# Patient Record
Sex: MALE | Race: WHITE | NOT HISPANIC OR LATINO | Employment: OTHER | URBAN - METROPOLITAN AREA
[De-identification: names, ages, dates, MRNs, and addresses within clinical notes are randomized per-mention and may not be internally consistent; named-entity substitution may affect disease eponyms.]

---

## 2023-06-06 ENCOUNTER — TELEPHONE (OUTPATIENT)
Dept: PRIMARY CARE | Facility: CLINIC | Age: 73
End: 2023-06-06
Payer: COMMERCIAL

## 2023-06-06 DIAGNOSIS — N40.1 BPH ASSOCIATED WITH NOCTURIA: ICD-10-CM

## 2023-06-06 DIAGNOSIS — E55.9 VITAMIN D DEFICIENCY: Primary | ICD-10-CM

## 2023-06-06 DIAGNOSIS — D70.9 NEUTROPENIA, UNSPECIFIED TYPE (CMS-HCC): ICD-10-CM

## 2023-06-06 DIAGNOSIS — R73.03 PREDIABETES: ICD-10-CM

## 2023-06-06 DIAGNOSIS — E78.5 DYSLIPIDEMIA, GOAL LDL BELOW 100: ICD-10-CM

## 2023-06-06 DIAGNOSIS — R35.1 BPH ASSOCIATED WITH NOCTURIA: ICD-10-CM

## 2023-06-06 NOTE — TELEPHONE ENCOUNTER
Pt called stating has an upcoming appt 6/22/23 asked for blood work order, would like orders to include PSA and TSH

## 2023-06-12 ENCOUNTER — LAB (OUTPATIENT)
Dept: LAB | Facility: LAB | Age: 73
End: 2023-06-12
Payer: COMMERCIAL

## 2023-06-12 DIAGNOSIS — E55.9 VITAMIN D DEFICIENCY: ICD-10-CM

## 2023-06-12 DIAGNOSIS — D70.9 NEUTROPENIA, UNSPECIFIED TYPE (CMS-HCC): ICD-10-CM

## 2023-06-12 DIAGNOSIS — N40.1 BPH ASSOCIATED WITH NOCTURIA: ICD-10-CM

## 2023-06-12 DIAGNOSIS — E78.5 DYSLIPIDEMIA, GOAL LDL BELOW 100: ICD-10-CM

## 2023-06-12 DIAGNOSIS — R35.1 BPH ASSOCIATED WITH NOCTURIA: ICD-10-CM

## 2023-06-12 DIAGNOSIS — R73.03 PREDIABETES: ICD-10-CM

## 2023-06-12 LAB
ANION GAP IN SER/PLAS: 13 MMOL/L (ref 10–20)
BASOPHILS (10*3/UL) IN BLOOD BY AUTOMATED COUNT: 0.03 X10E9/L (ref 0–0.1)
BASOPHILS/100 LEUKOCYTES IN BLOOD BY AUTOMATED COUNT: 0.6 % (ref 0–2)
CALCIDIOL (25 OH VITAMIN D3) (NG/ML) IN SER/PLAS: 26 NG/ML
CALCIUM (MG/DL) IN SER/PLAS: 9.3 MG/DL (ref 8.6–10.3)
CARBON DIOXIDE, TOTAL (MMOL/L) IN SER/PLAS: 26 MMOL/L (ref 21–32)
CHLORIDE (MMOL/L) IN SER/PLAS: 106 MMOL/L (ref 98–107)
CHOLESTEROL (MG/DL) IN SER/PLAS: 142 MG/DL (ref 0–199)
CHOLESTEROL IN HDL (MG/DL) IN SER/PLAS: 66.9 MG/DL
CHOLESTEROL/HDL RATIO: 2.1
CREATININE (MG/DL) IN SER/PLAS: 0.9 MG/DL (ref 0.5–1.3)
EOSINOPHILS (10*3/UL) IN BLOOD BY AUTOMATED COUNT: 0.17 X10E9/L (ref 0–0.4)
EOSINOPHILS/100 LEUKOCYTES IN BLOOD BY AUTOMATED COUNT: 3.6 % (ref 0–6)
ERYTHROCYTE DISTRIBUTION WIDTH (RATIO) BY AUTOMATED COUNT: 12.4 % (ref 11.5–14.5)
ERYTHROCYTE MEAN CORPUSCULAR HEMOGLOBIN CONCENTRATION (G/DL) BY AUTOMATED: 33.6 G/DL (ref 32–36)
ERYTHROCYTE MEAN CORPUSCULAR VOLUME (FL) BY AUTOMATED COUNT: 91 FL (ref 80–100)
ERYTHROCYTES (10*6/UL) IN BLOOD BY AUTOMATED COUNT: 4.72 X10E12/L (ref 4.5–5.9)
ESTIMATED AVERAGE GLUCOSE FOR HBA1C: 111 MG/DL
GFR MALE: 89 ML/MIN/1.73M2
GLUCOSE (MG/DL) IN SER/PLAS: 105 MG/DL (ref 74–99)
HEMATOCRIT (%) IN BLOOD BY AUTOMATED COUNT: 43.1 % (ref 41–52)
HEMOGLOBIN (G/DL) IN BLOOD: 14.5 G/DL (ref 13.5–17.5)
HEMOGLOBIN A1C/HEMOGLOBIN TOTAL IN BLOOD: 5.5 %
IMMATURE GRANULOCYTES/100 LEUKOCYTES IN BLOOD BY AUTOMATED COUNT: 0.2 % (ref 0–0.9)
LDL: 60 MG/DL (ref 0–99)
LEUKOCYTES (10*3/UL) IN BLOOD BY AUTOMATED COUNT: 4.7 X10E9/L (ref 4.4–11.3)
LYMPHOCYTES (10*3/UL) IN BLOOD BY AUTOMATED COUNT: 1.91 X10E9/L (ref 0.8–3)
LYMPHOCYTES/100 LEUKOCYTES IN BLOOD BY AUTOMATED COUNT: 40.4 % (ref 13–44)
MONOCYTES (10*3/UL) IN BLOOD BY AUTOMATED COUNT: 0.37 X10E9/L (ref 0.05–0.8)
MONOCYTES/100 LEUKOCYTES IN BLOOD BY AUTOMATED COUNT: 7.8 % (ref 2–10)
NEUTROPHILS (10*3/UL) IN BLOOD BY AUTOMATED COUNT: 2.24 X10E9/L (ref 1.6–5.5)
NEUTROPHILS/100 LEUKOCYTES IN BLOOD BY AUTOMATED COUNT: 47.4 % (ref 40–80)
PLATELETS (10*3/UL) IN BLOOD AUTOMATED COUNT: 178 X10E9/L (ref 150–450)
POTASSIUM (MMOL/L) IN SER/PLAS: 4.1 MMOL/L (ref 3.5–5.3)
PROSTATE SPECIFIC AG (NG/ML) IN SER/PLAS: 1.6 NG/ML (ref 0–4)
SODIUM (MMOL/L) IN SER/PLAS: 141 MMOL/L (ref 136–145)
TRIGLYCERIDE (MG/DL) IN SER/PLAS: 75 MG/DL (ref 0–149)
UREA NITROGEN (MG/DL) IN SER/PLAS: 13 MG/DL (ref 6–23)
VLDL: 15 MG/DL (ref 0–40)

## 2023-06-12 PROCEDURE — 80048 BASIC METABOLIC PNL TOTAL CA: CPT

## 2023-06-12 PROCEDURE — 82306 VITAMIN D 25 HYDROXY: CPT

## 2023-06-12 PROCEDURE — 80061 LIPID PANEL: CPT

## 2023-06-12 PROCEDURE — 85025 COMPLETE CBC W/AUTO DIFF WBC: CPT

## 2023-06-12 PROCEDURE — 83036 HEMOGLOBIN GLYCOSYLATED A1C: CPT

## 2023-06-12 PROCEDURE — 36415 COLL VENOUS BLD VENIPUNCTURE: CPT

## 2023-06-12 PROCEDURE — 84153 ASSAY OF PSA TOTAL: CPT

## 2023-06-22 ENCOUNTER — OFFICE VISIT (OUTPATIENT)
Dept: PRIMARY CARE | Facility: CLINIC | Age: 73
End: 2023-06-22
Payer: COMMERCIAL

## 2023-06-22 VITALS
OXYGEN SATURATION: 95 % | RESPIRATION RATE: 16 BRPM | SYSTOLIC BLOOD PRESSURE: 122 MMHG | HEART RATE: 90 BPM | TEMPERATURE: 97.4 F | HEIGHT: 70 IN | DIASTOLIC BLOOD PRESSURE: 76 MMHG | WEIGHT: 208.8 LBS | BODY MASS INDEX: 29.89 KG/M2

## 2023-06-22 DIAGNOSIS — D12.6 TUBULAR ADENOMA OF COLON: ICD-10-CM

## 2023-06-22 DIAGNOSIS — E55.9 VITAMIN D DEFICIENCY: ICD-10-CM

## 2023-06-22 DIAGNOSIS — K80.20 CALCULUS OF GALLBLADDER WITHOUT CHOLECYSTITIS WITHOUT OBSTRUCTION: ICD-10-CM

## 2023-06-22 DIAGNOSIS — I77.810 MILD ASCENDING AORTA DILATATION (CMS-HCC): ICD-10-CM

## 2023-06-22 DIAGNOSIS — I71.21 ANEURYSM OF ASCENDING AORTA WITHOUT RUPTURE (CMS-HCC): ICD-10-CM

## 2023-06-22 DIAGNOSIS — J30.2 SEASONAL ALLERGIES: ICD-10-CM

## 2023-06-22 DIAGNOSIS — I10 ESSENTIAL HYPERTENSION WITH GOAL BLOOD PRESSURE LESS THAN 130/85: ICD-10-CM

## 2023-06-22 DIAGNOSIS — I48.92 PAROXYSMAL ATRIAL FLUTTER (MULTI): Primary | ICD-10-CM

## 2023-06-22 DIAGNOSIS — E78.5 DYSLIPIDEMIA, GOAL LDL BELOW 70: ICD-10-CM

## 2023-06-22 DIAGNOSIS — K82.4 GALL BLADDER POLYP: ICD-10-CM

## 2023-06-22 PROBLEM — J32.9 RECURRENT SINUSITIS: Status: RESOLVED | Noted: 2023-06-22 | Resolved: 2023-06-22

## 2023-06-22 PROBLEM — R03.0 BLOOD PRESSURE ELEVATED WITHOUT HISTORY OF HTN: Status: RESOLVED | Noted: 2023-06-22 | Resolved: 2023-06-22

## 2023-06-22 PROBLEM — I34.0 MITRAL REGURGITATION: Status: ACTIVE | Noted: 2023-06-22

## 2023-06-22 PROBLEM — K21.9 GASTROESOPHAGEAL REFLUX DISEASE WITH HIATAL HERNIA: Status: ACTIVE | Noted: 2023-06-22

## 2023-06-22 PROBLEM — J34.2 DEVIATED NASAL SEPTUM: Status: ACTIVE | Noted: 2023-06-22

## 2023-06-22 PROBLEM — R73.03 PREDIABETES: Status: ACTIVE | Noted: 2023-06-22

## 2023-06-22 PROBLEM — J32.9 RECURRENT SINUSITIS: Status: ACTIVE | Noted: 2023-06-22

## 2023-06-22 PROBLEM — N40.0 BENIGN ENLARGEMENT OF PROSTATE: Status: ACTIVE | Noted: 2023-06-22

## 2023-06-22 PROBLEM — R09.81 NASAL CONGESTION: Status: ACTIVE | Noted: 2023-06-22

## 2023-06-22 PROBLEM — R03.0 BLOOD PRESSURE ELEVATED WITHOUT HISTORY OF HTN: Status: ACTIVE | Noted: 2023-06-22

## 2023-06-22 PROBLEM — K76.89 LIVER NODULE: Status: ACTIVE | Noted: 2023-06-22

## 2023-06-22 PROBLEM — R44.8 FACIAL PRESSURE: Status: ACTIVE | Noted: 2023-06-22

## 2023-06-22 PROBLEM — J06.9 UPPER RESPIRATORY INFECTION, ACUTE: Status: ACTIVE | Noted: 2023-06-22

## 2023-06-22 PROBLEM — L98.9 SKIN LESION: Status: ACTIVE | Noted: 2023-06-22

## 2023-06-22 PROBLEM — M17.12 ARTHRITIS OF KNEE, LEFT: Status: ACTIVE | Noted: 2023-06-22

## 2023-06-22 PROBLEM — H90.3 ASNHL (ASYMMETRICAL SENSORINEURAL HEARING LOSS): Status: ACTIVE | Noted: 2023-06-22

## 2023-06-22 PROBLEM — N52.9 MALE ERECTILE DISORDER OF ORGANIC ORIGIN: Status: ACTIVE | Noted: 2023-06-22

## 2023-06-22 PROBLEM — K44.9 GASTROESOPHAGEAL REFLUX DISEASE WITH HIATAL HERNIA: Status: ACTIVE | Noted: 2023-06-22

## 2023-06-22 PROBLEM — R93.89 ULTRASOUND SCAN ABNORMAL: Status: ACTIVE | Noted: 2023-06-22

## 2023-06-22 PROBLEM — E66.3 OVERWEIGHT WITH BODY MASS INDEX (BMI) 25.0-29.9: Status: ACTIVE | Noted: 2023-06-22

## 2023-06-22 PROBLEM — R09.81 NASAL CONGESTION: Status: RESOLVED | Noted: 2023-06-22 | Resolved: 2023-06-22

## 2023-06-22 PROBLEM — K43.9 VENTRAL HERNIA: Status: ACTIVE | Noted: 2023-06-22

## 2023-06-22 PROBLEM — H91.90 HEARING LOSS: Status: ACTIVE | Noted: 2023-06-22

## 2023-06-22 PROBLEM — H90.6 MIXED CONDUCTIVE AND SENSORINEURAL HEARING LOSS OF BOTH EARS: Status: ACTIVE | Noted: 2023-06-22

## 2023-06-22 PROBLEM — L98.9 SKIN LESION: Status: RESOLVED | Noted: 2023-06-22 | Resolved: 2023-06-22

## 2023-06-22 PROBLEM — J06.9 UPPER RESPIRATORY INFECTION, ACUTE: Status: RESOLVED | Noted: 2023-06-22 | Resolved: 2023-06-22

## 2023-06-22 PROBLEM — H93.11 TINNITUS OF RIGHT EAR: Status: ACTIVE | Noted: 2023-06-22

## 2023-06-22 PROCEDURE — 3078F DIAST BP <80 MM HG: CPT | Performed by: INTERNAL MEDICINE

## 2023-06-22 PROCEDURE — 99397 PER PM REEVAL EST PAT 65+ YR: CPT | Performed by: INTERNAL MEDICINE

## 2023-06-22 PROCEDURE — 1159F MED LIST DOCD IN RCRD: CPT | Performed by: INTERNAL MEDICINE

## 2023-06-22 PROCEDURE — 1036F TOBACCO NON-USER: CPT | Performed by: INTERNAL MEDICINE

## 2023-06-22 PROCEDURE — 3074F SYST BP LT 130 MM HG: CPT | Performed by: INTERNAL MEDICINE

## 2023-06-22 PROCEDURE — 1160F RVW MEDS BY RX/DR IN RCRD: CPT | Performed by: INTERNAL MEDICINE

## 2023-06-22 RX ORDER — TRAZODONE HYDROCHLORIDE 50 MG/1
1 TABLET ORAL NIGHTLY
COMMUNITY
Start: 2014-08-07 | End: 2023-12-01 | Stop reason: SDUPTHER

## 2023-06-22 RX ORDER — ATENOLOL 25 MG/1
1 TABLET ORAL DAILY
COMMUNITY
Start: 2020-01-10 | End: 2024-02-19 | Stop reason: SDUPTHER

## 2023-06-22 RX ORDER — ESOMEPRAZOLE MAGNESIUM 40 MG/1
CAPSULE, DELAYED RELEASE ORAL
COMMUNITY
Start: 2012-11-26 | End: 2023-06-22 | Stop reason: ALTCHOICE

## 2023-06-22 RX ORDER — APIXABAN 5 MG/1
1 TABLET, FILM COATED ORAL 2 TIMES DAILY
COMMUNITY
Start: 2021-06-21 | End: 2024-05-15

## 2023-06-22 RX ORDER — TAMSULOSIN HYDROCHLORIDE 0.4 MG/1
CAPSULE ORAL
COMMUNITY
Start: 2012-11-06 | End: 2024-02-19 | Stop reason: SDUPTHER

## 2023-06-22 RX ORDER — HYDROGEN PEROXIDE 3 %
20 SOLUTION, NON-ORAL MISCELLANEOUS
COMMUNITY
End: 2024-01-16 | Stop reason: ALTCHOICE

## 2023-06-22 RX ORDER — AZITHROMYCIN 250 MG/1
500 TABLET, FILM COATED ORAL
COMMUNITY
Start: 2005-06-07 | End: 2023-06-22 | Stop reason: ALTCHOICE

## 2023-06-22 RX ORDER — ATOVAQUONE AND PROGUANIL HYDROCHLORIDE 250; 100 MG/1; MG/1
1 TABLET, FILM COATED ORAL
COMMUNITY
Start: 2008-10-21 | End: 2023-06-22 | Stop reason: ALTCHOICE

## 2023-06-22 RX ORDER — SIMVASTATIN 20 MG/1
TABLET, FILM COATED ORAL
COMMUNITY
Start: 2017-01-24 | End: 2024-02-19 | Stop reason: SDUPTHER

## 2023-06-22 RX ORDER — CHOLECALCIFEROL (VITAMIN D3) 50 MCG
50 TABLET ORAL DAILY
Qty: 30 TABLET | Refills: 11 | COMMUNITY
Start: 2023-06-22

## 2023-06-22 RX ORDER — AZELASTINE 1 MG/ML
SPRAY, METERED NASAL
COMMUNITY

## 2023-06-22 RX ORDER — FLUTICASONE PROPIONATE 50 MCG
SPRAY, SUSPENSION (ML) NASAL
COMMUNITY
Start: 2018-11-13

## 2023-06-22 RX ORDER — CIPROFLOXACIN 500 MG/1
TABLET ORAL
COMMUNITY
Start: 2005-06-07 | End: 2023-06-22 | Stop reason: SINTOL

## 2023-06-22 ASSESSMENT — PATIENT HEALTH QUESTIONNAIRE - PHQ9
SUM OF ALL RESPONSES TO PHQ9 QUESTIONS 1 AND 2: 0
1. LITTLE INTEREST OR PLEASURE IN DOING THINGS: NOT AT ALL
2. FEELING DOWN, DEPRESSED OR HOPELESS: NOT AT ALL

## 2023-06-22 ASSESSMENT — ENCOUNTER SYMPTOMS
DEPRESSION: 0
OCCASIONAL FEELINGS OF UNSTEADINESS: 0
LOSS OF SENSATION IN FEET: 0

## 2023-06-22 NOTE — PROGRESS NOTES
"Subjective   Patient ID: Uvaldo Domingo is a 72 y.o. male who presents for Annual Exam.    Here for annual wellness visit.  Overall feels well.  Knees are doing pretty good.  He enjoys cycling about an hour every other day, and then walks on the other days he is not cycling.    Sometimes he wonders about depression-he feels a bit down sometimes.  Notes that after he cycles he feels just fine.    Recently enjoyed a 3-week vacation to [facility] with his wife         Review of Systems    Objective   /76 (BP Location: Left arm, Patient Position: Sitting, BP Cuff Size: Adult)   Pulse 90   Temp 36.3 °C (97.4 °F)   Resp 16   Ht 1.784 m (5' 10.25\")   Wt 94.7 kg (208 lb 12.8 oz)   SpO2 95%   BMI 29.75 kg/m²     Physical Exam  Constitutional:       Appearance: Normal appearance. He is obese.   HENT:      Head: Normocephalic and atraumatic.      Right Ear: Tympanic membrane normal.      Left Ear: Tympanic membrane normal.      Nose: Nose normal.   Eyes:      General: No scleral icterus.     Extraocular Movements: Extraocular movements intact.      Conjunctiva/sclera: Conjunctivae normal.      Pupils: Pupils are equal, round, and reactive to light.   Cardiovascular:      Rate and Rhythm: Normal rate and regular rhythm.      Pulses: Normal pulses.      Heart sounds: Normal heart sounds. No murmur heard.  Pulmonary:      Effort: Pulmonary effort is normal. No respiratory distress.      Breath sounds: Normal breath sounds. No stridor. No wheezing.   Abdominal:      General: Abdomen is flat. Bowel sounds are normal. There is no distension.      Palpations: Abdomen is soft. There is no mass.      Tenderness: There is no abdominal tenderness. There is no guarding.   Musculoskeletal:         General: No swelling, tenderness or deformity. Normal range of motion.      Cervical back: Normal range of motion and neck supple. No tenderness.   Lymphadenopathy:      Cervical: No cervical adenopathy.   Skin:     General: Skin is " warm and dry.      Findings: No lesion or rash.   Neurological:      General: No focal deficit present.      Mental Status: He is alert and oriented to person, place, and time.      Cranial Nerves: No cranial nerve deficit.      Motor: No weakness.   Psychiatric:         Mood and Affect: Mood normal.         Behavior: Behavior normal.         Thought Content: Thought content normal.         Judgment: Judgment normal.         Assessment/Plan   Problem List Items Addressed This Visit       Cholelithiasis    Gall bladder polyp    Relevant Orders    US gallbladder    Ascending aortic aneurysm (CMS/HCC)    Mild ascending aorta dilatation (CMS/HCC)    Paroxysmal atrial flutter (CMS/HCC) - Primary    Seasonal allergies    Tubular adenoma of colon     Other Visit Diagnoses       Essential hypertension with goal blood pressure less than 130/85        Relevant Orders    Follow Up In [facility] - PCP    Dyslipidemia, goal LDL below 70        Vitamin D deficiency        Relevant Medications    cholecalciferol (Vitamin D3) 50 MCG (2000 UT) tablet          A. Flutter w/ RVR April '20 -he was admitted in [place]-converted chemically. Asymptomatic. He saw his cardiologist, Dr. Mendoza in fall '21. No recent episodes.      hx P. A fib / palpitations - in 2014. Negative Gxt per Dr. Mendoza. No recurrence. Requested a change of cardiologists. Recommended setting up and appt when he returns from [place] in Spring '23 w/ Dr. Perez     Elev. chol / /history of borderline elevated blood pressure/history of tobacco use- negative cardiac stress test 8/14 per Dr. Mendoza. Goal LDL <70      with aorta concern          Jun '23; LDL - 60  on statin     exercise -   In winter, will do stationary cycle   weather permitting - outdoor cycling  4-5 times weekly- every other day or so for an hour. He reports that he cycles about 10 miles in the [place] [place] [facility]\"" and 15 miles when located in [place].  Walks 30 min. On the days he  "doesn't cycle     mildly dilated ascending arch - noted on 2014 and 2016 echo exams. Reexamined in [facility] in April 2020, and again Sept 2020 - stable at 4.5 cm.    Fall '21 measurements - 4.0 cm. He will continue to follow in cardiology. He'll set up appt. in 15 months w/ Dr. Perez          Echo planned July '23, w/ follow up Nov '23        Prediabetes/elevated weight-weight loss discussed. Last time Suggested download a Alonso for his cell phone- Lose It to start to track all calories for the next 3 weeks and then make changes towards a 3-4 pound a month weight loss goal. Weight down a bit this yr. once again encouraged weight loss efforts through the winter               Weight remains a concern-with a BMI at 29. He will continue weight loss efforts.  He remains mindful of caloric intake, limiting carbs and alcohol and maintaining an active exercise routine.  A1c June 2023 improved to 5.5%.       Right partial knee replacement July 2020 in [facility]-right knee DJD -originally with arthroscopy completed in Dec '18 in [facility],  Now cycling again. walking occas causes knee soreness.            Presently both knees \"ok\"        acid reflux -  Hiatal hernia  w/ weight loss - off daily PPI; using OTC nexium prn     Non calcified nodule - CT scan revealed nodules less than 6 cm.. Discussed risk assessment as patient is a non smoker - no f/u CT needed based on Fleischner Criteria      Liver nodule - Jan '23 US - right hepatic cyst, and a likely hemangioma     colon polyps / innumerable diverticuli / family hx colon cancer -colonoscopy report filed in Gastro testing section-reviewed with him today. colonoscopy every 3 yrs,  Updated colonoscopy in [facility] - 10/14/20; next in 3 yrs - Oct '23        Incidental cholelithiasis-not problematic.      gallbladder polyp on 6/22 ultrasound. This was repeated in January 2023- The radiologist notes that the gallbladder polyp appears stable from where it was in June " 2022. The radiologist concludes that continued attention to this finding would be in order on subsequent exams. We will plan to check this again in 12 months.             Next gallbladder ultrasound would be in January 2024.       bph - improved w/ flomax. he'll see dr. Tena annually- late Jun. PSA normal 6/23    Remote nephrolithiasis-not problematic since last flare in his 30s.     sleeplessness-he has been on trazodone Presently doing fairly well.      glasses - he sees dr. momin 1-2 x yr here      right ear pressure and ringing x 3-4 wks. Discussed starting Allegra 180 for the next 2-3 weeks for improvement. No concerns. Will consult audiology for a hearing aid.           Hearing aids on hold for now     Skin Care concerns - encouraged skin care, sun screen when outdoors, and follow up w/ dermatology w/ any concerning skin changes.             2022; He has a concern on his back. He will set up dermatology consultation     Cyst to left middle digit - Follow up with hand surgeon suggested // declined.     dental care - semiannually-he follows with a dentist in [place].- UTD     Depression concerns-6/23-sometimes he feels a bit down in the morning.  He does not think it serious.  Discussed maintaining a routine, including exercise and including activities which purpose wellness, such as volunteering.  Encouraged him to call with concerns    travel concerns - he's in [place] for 2-3 months 4 x yr w/ wife who is [place], and their daughter and her family live there     Flu shot encouraged each fall-in September ideally     Covid series completed.     Shingrix series completed.     Follow-up in 6 months sooner with concerns.

## 2023-11-21 ENCOUNTER — APPOINTMENT (OUTPATIENT)
Dept: CARDIOLOGY | Facility: CLINIC | Age: 73
End: 2023-11-21
Payer: COMMERCIAL

## 2023-12-01 DIAGNOSIS — G47.09 OTHER INSOMNIA: Primary | ICD-10-CM

## 2023-12-01 RX ORDER — TRAZODONE HYDROCHLORIDE 50 MG/1
50 TABLET ORAL NIGHTLY
Qty: 90 TABLET | Refills: 3 | Status: SHIPPED | OUTPATIENT
Start: 2023-12-01

## 2023-12-12 DIAGNOSIS — I48.92 PAROXYSMAL ATRIAL FLUTTER (MULTI): Primary | ICD-10-CM

## 2023-12-12 DIAGNOSIS — M54.50 LUMBAR BACK PAIN: ICD-10-CM

## 2024-01-16 ENCOUNTER — OFFICE VISIT (OUTPATIENT)
Dept: CARDIOLOGY | Facility: CLINIC | Age: 74
End: 2024-01-16
Payer: COMMERCIAL

## 2024-01-16 VITALS
WEIGHT: 216.3 LBS | OXYGEN SATURATION: 97 % | DIASTOLIC BLOOD PRESSURE: 75 MMHG | HEART RATE: 60 BPM | BODY MASS INDEX: 29.3 KG/M2 | SYSTOLIC BLOOD PRESSURE: 128 MMHG | HEIGHT: 72 IN

## 2024-01-16 DIAGNOSIS — I71.21 ANEURYSM OF ASCENDING AORTA WITHOUT RUPTURE (CMS-HCC): Primary | ICD-10-CM

## 2024-01-16 DIAGNOSIS — I48.92 PAROXYSMAL ATRIAL FLUTTER (MULTI): ICD-10-CM

## 2024-01-16 PROCEDURE — 1160F RVW MEDS BY RX/DR IN RCRD: CPT | Performed by: INTERNAL MEDICINE

## 2024-01-16 PROCEDURE — 1159F MED LIST DOCD IN RCRD: CPT | Performed by: INTERNAL MEDICINE

## 2024-01-16 PROCEDURE — 99214 OFFICE O/P EST MOD 30 MIN: CPT | Performed by: INTERNAL MEDICINE

## 2024-01-16 PROCEDURE — 1126F AMNT PAIN NOTED NONE PRSNT: CPT | Performed by: INTERNAL MEDICINE

## 2024-01-16 PROCEDURE — 1036F TOBACCO NON-USER: CPT | Performed by: INTERNAL MEDICINE

## 2024-01-16 ASSESSMENT — COLUMBIA-SUICIDE SEVERITY RATING SCALE - C-SSRS
1. IN THE PAST MONTH, HAVE YOU WISHED YOU WERE DEAD OR WISHED YOU COULD GO TO SLEEP AND NOT WAKE UP?: NO
2. HAVE YOU ACTUALLY HAD ANY THOUGHTS OF KILLING YOURSELF?: NO
6. HAVE YOU EVER DONE ANYTHING, STARTED TO DO ANYTHING, OR PREPARED TO DO ANYTHING TO END YOUR LIFE?: NO

## 2024-01-16 ASSESSMENT — PATIENT HEALTH QUESTIONNAIRE - PHQ9
2. FEELING DOWN, DEPRESSED OR HOPELESS: NOT AT ALL
1. LITTLE INTEREST OR PLEASURE IN DOING THINGS: NOT AT ALL
SUM OF ALL RESPONSES TO PHQ9 QUESTIONS 1 AND 2: 0

## 2024-01-16 ASSESSMENT — PAIN SCALES - GENERAL: PAINLEVEL: 0-NO PAIN

## 2024-01-16 NOTE — PROGRESS NOTES
Cardiology Office Note    REASON FOR VISIT:  Follow up     PCP (requesting provider): Aleksander Prabhakar MD.    HPI:  Uvaldo Domingo is a 73 y.o. male with PMH of Atrial flutter, DLD, Pre-DM, and Ascending aortic aneurysm, who is presenting for a follow up visit, last seen in our clinic in June 2023. The patient travels back and forth between [facility] and [place], spending approximately 6 months in the  and 6 months in [facility] (he does not have an established cardiologist in [facility], but his previous cardiologist in [place] is aware of his medical history). He is asymptomatic, denied CP or SOB, and ROS is negative. He reported compliance to his medication. He underwent TTE 7/5/23 for ascending AA follow up with a mid ascending aorta 4.2 cm ( compared to 3.6 cm in 2019. He will be followed up with CT angio in July with RTC in August 2024.       Patient's last hospitalization was back in [facility] for Atrial flutter with 2:1 conduction and he converted back to sinus rhythm with Metoprolol and Digoxin. No other major hospitalizations      Past Medical History:  - Atrial fibrillation with palpitations back in 2014. Negative event monitor per Dr. Mendoza. No recurrence.  - Atrial flutter with RVR April 2020 and admitted to the hospital in [facility] and converted chemically. He then saw his cardiologist, Dr. Mendoza in fall '21. No recent episodes.  - Hyperlipidemia  - Mild dilation of the ascending aortic arch (Last measurement 4 cm in fall of 2021)  - BPH  - Pre-DM        PAST MEDICAL/SURGICAL HISTORY  Past Medical History:   Diagnosis Date    Acute sinusitis, unspecified 11/12/2012    Acute sinusitis    Derangement of unspecified medial meniscus due to old tear or injury, right knee 11/14/2018    Old tear of medial meniscus of right knee, unspecified tear type    Effusion, left knee 08/03/2018    Effusion of left knee    Encounter for general adult medical examination without abnormal findings  07/08/2015    Encounter for Medicare annual wellness exam    Headache, unspecified 11/13/2018    Frontal headache    Iliotibial band syndrome, right leg 10/01/2019    It band syndrome, right    Impaired fasting glucose 05/17/2019    Elevated fasting glucose    Osteoarthritis of knee, unspecified 01/19/2015    Osteoarthritis of knee    Other conditions influencing health status 01/28/2015    Cardiovascular Stress Test    Other specified diseases of liver 06/21/2022    Liver nodule    Pain in right hip 10/04/2017    Right hip pain    Pain in right knee 11/14/2018    Right knee pain    Pain in unspecified knee 04/30/2015    Knee pain    Palpitations 08/13/2014    Palpitations    Personal history of other diseases of the circulatory system 03/05/2018    History of orthostatic hypotension    Personal history of other diseases of the digestive system 06/21/2022    History of cholelithiasis    Personal history of other diseases of the respiratory system 01/29/2019    History of sinusitis    Personal history of urinary calculi 11/19/2019    History of renal calculi    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  Echocardiogram:   Echocardiogram     [facility], [address] [place] [address]  Tel [phone] and Fax [phone]    TRANSTHORACIC ECHOCARDIOGRAM REPORT      Patient Name:     ORLANDO Sung Physician:   [id number] Paty Damon MD  Study Date:       7/5/2023        Referring  Physician: SEBLE GUZMAN  MRN/PID:          [id number]        PCP:  Accession/Order#: [id number]    Department Location: [facility]  YOB: 1950       Fellow:  Gender:           M               Nurse:  Admit Date:                       Sonographer:         Kin Cadena [facility]  Admission Status: Outpatient      Additional Staff:  Height:           180.34 cm       CC Report to:  Weight:           92.99 kg        Study Type:          Echocardiogram  BSA:              2.13 m2  Blood Pressure: 117 /69 mmHg    Diagnosis/ICD: I71.21-Aneurysm of the ascending aorta, without rupture  Indication:    Ascending Ao aneurysm  Procedure/CPT: Echo Complete w Full [id number]    Patient History:  Pertinent History: Atrial flutter (Paroxysmal), HTN, Ascending Ao Aneurysm.    Study Detail: The following Echo studies were performed: 2D, M-Mode, Doppler and  color flow. Technically challenging study due to prominent lung  artifact and body habitus.      PHYSICIAN INTERPRETATION:  Left Ventricle: The left ventricular systolic function is normal, with an estimated ejection fraction of 60-65%. There are no regional wall motion abnormalities. The left ventricular cavity size is normal. Spectral Doppler shows a normal pattern of left ventricular diastolic filling.  Left Atrium: The left atrium is normal in size.  Right Ventricle: The right ventricle is normal in size. There is normal right ventricular global systolic function.  Right Atrium: The right atrium is mildly dilated.  Aortic Valve: The aortic valve is trileaflet. There is mild aortic valve regurgitation. The peak instantaneous gradient of the aortic valve is 3.3 mmHg. The mean gradient of the aortic valve is 1.9 mmHg.  Mitral Valve: The mitral valve is normal in structure. There is moderate mitral annular calcification. There is trace mitral valve regurgitation.  Tricuspid Valve: The tricuspid valve is structurally normal. There is mild tricuspid  regurgitation. The Doppler estimated RVSP is within normal limits at 35.9 mmHg.  Pulmonic Valve: The pulmonic valve is not well visualized. There is physiologic pulmonic valve regurgitation.  Pericardium: There is a trivial pericardial effusion.  Aorta: The aortic root is abnormal. There is mild dilatation of the ascending aorta. There is moderate dilatation the aortic root.  Systemic Veins: The inferior vena cava appears to be of normal size. There is IVC inspiratory collapse greater than 50%.  In comparison to the previous echocardiogram(s): Compared with the prior exam form 12/13/2019 The prior mid ascending aorta was measrued at 3.6 cm and appears to be larger today( 4.2 cm) , though the ascending aorta was more extensively interrogated today. The aortic sinuses appear moderately dilated but the size appears to be stable at 4.4 cm.      CONCLUSIONS:  1. Left ventricular systolic function is normal with a 60-65% estimated ejection fraction.  2. There is moderate mitral annular calcification.  3. RVSP within normal limits.  4. Mild aortic valve regurgitation.  5. Compared with the prior exam form 12/13/2019 The prior mid ascending aorta was measrued at 3.6 cm and appears to be larger today( 4.2 cm) , though the ascending aorta was more extensively interrogated today. The aortic sinuses appear moderately dilated but the size appears to be stable at 4.4 cm.  6. There is moderate dilatation of the aortic root.    QUANTITATIVE DATA SUMMARY:  2D MEASUREMENTS:  Normal Ranges:  LAs:           3.40 cm   (2.7-4.0cm)  IVSd:          1.13 cm   (0.6-1.1cm)  LVPWd:         0.96 cm   (0.6-1.1cm)  LVIDd:         5.17 cm   (3.9-5.9cm)  LVIDs:         3.44 cm  LV Mass Index: 95.6 g/m2  LV % FS        33.4 %    LA VOLUME:  Normal Ranges:  LA Vol A4C:        59.1 ml    (22+/-6mL/m2)  LA Vol A2C:        59.3 ml  LA Vol BP:         59.7 ml  LA Vol Index A4C:  27.7ml/m2  LA Vol Index A2C:  27.8 ml/m2  LA Vol Index BP:   28.0 ml/m2  LA  Area A4C:       20.6 cm2  LA Area A2C:       20.8 cm2  LA Major Axis A4C: 6.1 cm  LA Major Axis A2C: 6.2 cm  LA Volume Index:   27.9 ml/m2  LA Vol A4C:        54.5 ml  LA Vol A2C:        57.8 ml    RA VOLUME BY A/L METHOD:  Normal Ranges:  RA Vol A4C:        68.2 ml    (8.3-19.5ml)  RA Vol Index A4C:  32.0 ml/m2  RA Area A4C:       21.0 cm2  RA Major Axis A4C: 5.5 cm    AORTA MEASUREMENTS:  Normal Ranges:  Ao Sinus, d: 4.40 cm (2.1-3.5cm)  Ao STJ, d:   3.30 cm (1.7-3.4cm)  Asc Ao, d:   4.20 cm (2.1-3.4cm)  Ao Arch:     2.90 cm (2.0-3.6cm)    LV SYSTOLIC FUNCTION BY 2D PLANIMETRY (MOD):  Normal Ranges:  EF-A4C View: 61.4 % (>=55%)  EF-A2C View: 64.2 %  EF-Biplane:  63.2 %    LV DIASTOLIC FUNCTION:  Normal Ranges:  MV Peak E:      0.71 m/s    (0.7-1.2 m/s)  MV Peak A:      0.93 m/s    (0.42-0.7 m/s)  E/A Ratio:      0.76        (1.0-2.2)  MV e'           0.08 m/s    (>8.0)  MV lateral e'   0.09 m/s  MV medial e'    0.07 m/s  MV A Dur:       131.49 msec  E/e' Ratio:     8.82        (<8.0)  PulmV Sys Jono:  47.77 cm/s  PulmV Alanis Jono: 29.98 cm/s  PulmV S/D Jono:  1.59    MITRAL VALVE:  Normal Ranges:  MV DT: 301 msec (150-240msec)    AORTIC VALVE:  Normal Ranges:  AoV Vmax:                0.91 m/s (<=1.7m/s)  AoV Peak PG:             3.3 mmHg (<20mmHg)  AoV Mean P.9 mmHg (1.7-11.5mmHg)  LVOT Max Jono:            0.66 m/s (<=1.1m/s)  AoV VTI:                 19.82 cm (18-25cm)  LVOT VTI:                14.54 cm  LVOT Diameter:           2.34 cm  (1.8-2.4cm)  AoV Area, VTI:           3.15 cm2 (2.5-5.5cm2)  AoV Area,Vmax:           3.13 cm2 (2.5-4.5cm2)  AoV Dimensionless Index: 0.73      RIGHT VENTRICLE:  RV Basal 3.80 cm  RV Mid   3.20 cm  RV Major 8.2 cm  TAPSE:   23.0 mm  RV s'    0.12 m/s    TRICUSPID VALVE/RVSP:  Normal Ranges:  Peak TR Velocity: 2.64 m/s  RV Syst Pressure: 35.9 mmHg (< 30mmHg)  IVC Diam:         2.10 cm    PULMONIC VALVE:  Normal Ranges:  PV Max Jono: 0.8 m/s  (0.6-0.9m/s)  PV Max PG:   2.8 mmHg    Pulmonary Veins:  PulmV Alanis Jono: 29.98 cm/s  PulmV S/D Jono:  1.59  PulmV Sys Jono:  47.77 cm/s      [id number] Paty Damon MD  Electronically signed on 7/5/2023 at 8:37:08 PM    Stress Testing: No results found for this or any previous visit from the past 1825 days.    Cardiac Catheterization: No results found for this or any previous visit from the past 1825 days.  No results found for this or any previous visit from the past 3650 days.     Cardiac Scoring: No results found for this or any previous visit from the past 1825 days.    AAA : No results found for this or any previous visit from the past 1825 days.    OTHER: No results found for this or any previous visit from the past 1825 days.      REVIEW OF SYSTEMS   Constitutional: not feeling tired.   Cardiovascular: No palpitations, but no intermittent leg claudication, no chest pain, no lower extremity edema and as noted in HPI.   Respiratory: no cough, no shortness of breath and no shortness of breath during exertion.   Gastrointestinal: no change in bowel habits and no blood in stools.   Musculoskeletal: no arthralgias and no myalgias.   Integumentary: no skin rashes.   Neurological: no seizures, no frequent falls, no headache, no dizziness, no numbness and no fainting.   All other systems have been reviewed and are negative for complaint.     FAMILY HISTORY  No family history on file.    SOCIAL HISTORY  Social History     Tobacco Use    Smoking status: Never    Smokeless tobacco: Never   Substance Use Topics    Alcohol use: Yes     Alcohol/week: 7.0 standard drinks of alcohol     Types: 7 Glasses of wine per week    Drug use: Not on file       VITALS  Vitals:    01/16/24 0906   BP: 128/75   Pulse: 60   SpO2: 97%      Body mass index is 29.75 kg/m².    PHYSICAL EXAM   Constitutional: alert and in no acute distress.   Eyes: no erythema, swelling or discharge from the eye .   Neck: neck is supple, symmetric, trachea midline, no masses  and no thyromegaly .  "  Pulmonary: no increased work of breathing or signs of respiratory distress  and lungs clear to auscultation.    Cardiovascular: carotid pulses 2+ bilaterally with no bruit , JVP was normal, no thrills , regular rhythm, normal S1 and S2, no murmurs , pedal pulses 2+ bilaterally  and no edema .   Abdomen: abdomen non-tender, no masses  and no hepatomegaly .   Skin: skin warm and dry, normal skin turgor .   Psychiatric judgment and insight is normal , oriented to person, place and time  and normal mood and affect .    LABS  Recent Labs     06/12/23  0737 06/16/22  0850   WBC 4.7 3.9*   HGB 14.5 14.5   HCT 43.1 42.5    175   MCV 91 90     No results for input(s): \"PTT\", \"INR\", \"HAUF\", \"DDIMERVTE\", \"HAPTOGLOBIN\", \"FIBRINOGEN\" in the last 50825 hours.  Recent Labs     06/12/23  0737 06/16/22  0850 11/10/21  1026 05/14/21  0833 05/08/19  0803 11/08/18  0739 03/20/18  0820    140 140 140 140   < > 143   K 4.1 4.0 4.1 4.2 4.4   < > 4.2    108* 108* 106 105   < > 106   CO2 26 25 26 27 27   < > 27   ANIONGAP 13 11 10 11 12   < > 14   BUN 13 15 14 15 16   < > 13   CREATININE 0.90 0.85 0.86 0.87 0.92   < > 0.81   ALT  --  16  --  34 16  --  14    < > = values in this interval not displayed.     Recent Labs     06/16/22  0850 05/14/21  0833 05/08/19  0803 03/20/18  0820   TSH 1.98 2.22 3.27 2.75      No results for input(s): \"LDH\", \"CKMB\", \"TROPHS\", \"BNP\" in the last 38649 hours.    No lab exists for component: \"CK\", \"CKMBP\"  Recent Labs     06/12/23  0737 06/16/22  0850 05/14/21  0833   CHOL 142 129 121   LDLF 60 48 49   HDL 66.9 66.0 59.0   TRIG 75 76 64   HGBA1C 5.5 5.8* 5.9       ALLERGIES  No Known Allergies    MEDICATIONS  Current Outpatient Medications   Medication Instructions    atenolol (Tenormin) 25 mg tablet 1 tablet, oral, Daily    azelastine (Astelin) 137 mcg (0.1 %) nasal spray INSTILL 2 SPRAY Twice daily each nostril    cholecalciferol (VITAMIN D3) 50 mcg, oral, Daily    Eliquis 5 mg tablet 1 " tablet, oral, 2 times daily    fluticasone (Flonase) 50 mcg/actuation nasal spray nasal    simvastatin (Zocor) 20 mg tablet oral    tamsulosin (Flomax) 0.4 mg 24 hr capsule oral    traZODone (DESYREL) 50 mg, oral, Nightly       ASSESSMENT/PLAN    Uvaldo Domingo is a 73 y.o. male with PMH of Atrial flutter, DLD, Pre-DM, and Ascending aortic aneurysm, who is presenting for a follow up visit    #ascending aortic dilation  -TTE 7/5/23: EF 60-65%, mid aortic aneurysm 4.2 cm   - TTE 2020 at [facility]: EF 57%, and Aortic root dilation at 4.5 cm  - CT angiography of chest 11/2021: Aneurysmal dilation of the ascending aorta measuring up to 4.0 cm    Plan:  Follow up CT-angio chest in July 2024   Perform RFP to check Cr lvl prior to the CT scan   RTC in August 2024 for evaluation      #atrial fibrillation and flutter   - Atrial fibrillation with palpitations back in 2014. Negative event monitor per Dr. Mendoza. No recurrence.  - Atrial flutter with RVR April 2020 and admitted to the hospital in [facility] and converted chemically  - HR 60  today  - CHADS-VASC: 2   Plan:  Eliquis 5 bid   Atenolol 25 daily      #DLD   - Latest Lipid panel on 6/12/23: LDL 60, and TGs 75, Chol 142   - Highest LDL back in 2015 was 109  Plan:  Continue Simvastatin 20 mg daily     #Pre-DM  - A1C 5.8 on 6/16/2022  -A1C 5.5 on 6/12/23     - RTC in 6 months     #Modifiable CV Risk Factor Summary  - BP: at goal  - LDL: at goal  - Diabetes: Most recent A1c 5.5 (6/23)  - Obesity: body mass index is 29.75 kg/m².     Follow-up in the office in 6 months     Patient was seen and examined by Attending Physician, Dr. Perez, who agrees with this plan.     Ronny Ibrahim MD  Internal Medicine, PGY-3  [facility]

## 2024-01-18 ENCOUNTER — ANCILLARY PROCEDURE (OUTPATIENT)
Dept: RADIOLOGY | Facility: CLINIC | Age: 74
End: 2024-01-18
Payer: COMMERCIAL

## 2024-01-18 ENCOUNTER — OFFICE VISIT (OUTPATIENT)
Dept: ORTHOPEDIC SURGERY | Facility: CLINIC | Age: 74
End: 2024-01-18
Payer: COMMERCIAL

## 2024-01-18 DIAGNOSIS — M54.50 LUMBAR BACK PAIN: ICD-10-CM

## 2024-01-18 DIAGNOSIS — M47.816 SPONDYLOSIS OF LUMBAR JOINT: Primary | ICD-10-CM

## 2024-01-18 DIAGNOSIS — K82.4 GALL BLADDER POLYP: ICD-10-CM

## 2024-01-18 PROCEDURE — 1036F TOBACCO NON-USER: CPT | Performed by: PHYSICAL MEDICINE & REHABILITATION

## 2024-01-18 PROCEDURE — 1160F RVW MEDS BY RX/DR IN RCRD: CPT | Performed by: PHYSICAL MEDICINE & REHABILITATION

## 2024-01-18 PROCEDURE — 76705 ECHO EXAM OF ABDOMEN: CPT | Performed by: RADIOLOGY

## 2024-01-18 PROCEDURE — 1126F AMNT PAIN NOTED NONE PRSNT: CPT | Performed by: PHYSICAL MEDICINE & REHABILITATION

## 2024-01-18 PROCEDURE — 76705 ECHO EXAM OF ABDOMEN: CPT

## 2024-01-18 PROCEDURE — 99203 OFFICE O/P NEW LOW 30 MIN: CPT | Performed by: PHYSICAL MEDICINE & REHABILITATION

## 2024-01-18 PROCEDURE — 1159F MED LIST DOCD IN RCRD: CPT | Performed by: PHYSICAL MEDICINE & REHABILITATION

## 2024-01-18 RX ORDER — METHOCARBAMOL 750 MG/1
750 TABLET, FILM COATED ORAL 4 TIMES DAILY
Qty: 45 TABLET | Refills: 1 | Status: SHIPPED | OUTPATIENT
Start: 2024-01-18 | End: 2024-02-07

## 2024-01-18 SDOH — SOCIAL STABILITY: SOCIAL NETWORK: SOCIAL ACTIVITY:: 5

## 2024-01-18 NOTE — PROGRESS NOTES
I saw and evaluated the patient. I personally obtained the key and critical portions of the history and physical exam or was physically present for key and critical portions performed by the resident/fellow. I reviewed the resident/fellow's documentation and discussed the patient with the resident/fellow. I agree with the resident/fellow's medical decision making as documented in the note.    Get CTA chest for surveillance of aortic aneurysm in 7/2024 with RFP prior. RTC 7/2024 prior to him returning to [facility].    Maryjane Perez MD

## 2024-01-18 NOTE — LETTER
January 18, 2024     Aleksander Prabhakar MD  [address]  [facility], [address]  [place] [address]    Patient: Uvaldo Domingo   YOB: 1950   Date of Visit: 1/18/2024       Dear Dr. Aleksander Prabhakar MD:    Thank you for referring Uvaldo Domingo to me for evaluation. Below are my notes for this consultation.  If you have questions, please do not hesitate to call me. I look forward to following your patient along with you.       Sincerely,     Dominick Cheema MD      CC: No Recipients  ______________________________________________________________________________________    PM&R  / Ortho clinic eval:    IMPRESSION:  Chronic low back pain almost certainly due to lumbar spondylosis, right lower lumbar    RECOMMENDATIONS:  -Get lumbar x-ray at [facility]   -I reviewed the report from his CT scan from [place], the English translation is somewhat challenging, but clearly shows spondylosis probably with ligament calcifications.  I do not think we need an advanced image on top of this prior to procedures  -PT referral  -If not improving he should call for right L3-4 medial branch and L5 dorsal primary ramus block x 2 followed by radiofrequency ablation if helpful.  For the diagnostic blocks at least he can stay on Eliquis.  She is treated for A-fib without many episodes  -Prescribed methocarbamol for exacerbation recommended Tylenol  but for very severe episodes at low doses ibuprofen probably worth the risk of bleeding for exacerbations.   f/u 6 to 8 weeks    Diagnoses and plan discussed with the patient, patient educated on above diagnoses and treatments, including alternatives     Dominick Cheema MD, FAAPMR, R-MSK  Chief, Division of PM&R  Board Certified in PM&R and Sports Medicine    Carbon copy : Vanna FYMARCO  ____________________________________________________________________    1/18/2024    CC: Patient complains of chronic low back pain    HPI:   Retired soo [employer]  at [employer].  Enjoys bicycling H/o LBP since late 60s gradually worse -    Seen at the request of pcp Dr Prabhakar.    The pain is axial only without radiation, achy deep bilateral  increases with bending and reaching forward and is relieved by rest especially sitting, lumbar support.  Ibuprofen only for very severe  episodies    Pain scale  4/10 on average and 8/10 worst pain in past week.      Patient reports no fevers, chills, sweats, night pain, weight loss or cancer history no bowel . Bladder issues.    I reviewed the Orthopedic Surgery Adult Patient History Sheet from 1/18/2024  including pertinent history and ROS.      Pertinent Physical Exam:  MSK: Lumbar Spine: Mod reduced ROM all planes more limited in extension with mild pain Extension right lateral bending, tender to palpation minimally but localizes to the right lumbar paraspinals without significant spasm, SI joint maneuvers negative.  Str Leg Raise negative, hip provocative maneuvers negative .  Neuro: Normal Sensation, strength, bulk and tone of  lower limbs bilaterally, reflexes normal.      SUPPORTING DOCUMENTATION (remaining history, exam, other findings):  Work-up reviewed - this has included CAT scan done in [place], he brings English translation, showing spondylosis and what sounds like ligament calcifications without significant stenosis?    Treatment has included ibuprofen for bad episodes, otherwise has not had much treatment    See above for Assessment and Plan

## 2024-01-18 NOTE — PROGRESS NOTES
PM&R  / Ortho clinic eval:    IMPRESSION:  Chronic low back pain almost certainly due to lumbar spondylosis, right lower lumbar    RECOMMENDATIONS:  -Get lumbar x-ray at [facility]   -I reviewed the report from his CT scan from [facility], the English translation is somewhat challenging, but clearly shows spondylosis probably with ligament calcifications.  I do not think we need an advanced image on top of this prior to procedures  -PT referral  -If not improving he should call for right L3-4 medial branch and L5 dorsal primary ramus block x 2 followed by radiofrequency ablation if helpful.  For the diagnostic blocks at least he can stay on Eliquis.  She is treated for A-fib without many episodes  -Prescribed methocarbamol for exacerbation recommended Tylenol  but for very severe episodes at low doses ibuprofen probably worth the risk of bleeding for exacerbations.   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  Enjoys bicycling H/o LBP since late 60s gradually worse -    Seen at the request of pcp Dr Prabhakar.    The pain is axial only without radiation, achy deep bilateral  increases with bending and reaching forward and is relieved by rest especially sitting, lumbar support.  Ibuprofen only for very severe  episodies    Pain scale  4/10 on average and 8/10 worst pain in past week.      Patient reports no fevers, chills, sweats, night pain, weight loss or cancer history no bowel . Bladder issues.    I reviewed the Orthopedic Surgery Adult Patient History Sheet from 1/18/2024  including pertinent  history and ROS.      Pertinent Physical Exam:  MSK: Lumbar Spine: Mod reduced ROM all planes more limited in extension with mild pain Extension right lateral bending, tender to palpation minimally but localizes to the right lumbar paraspinals without significant spasm, SI joint maneuvers negative.  Str Leg Raise negative, hip provocative maneuvers negative .  Neuro: Normal Sensation, strength, bulk and tone of  lower limbs bilaterally, reflexes normal.      SUPPORTING DOCUMENTATION (remaining history, exam, other findings):  Work-up reviewed - this has included CAT scan done in [place], he brings English translation, showing spondylosis and what sounds like ligament calcifications without significant stenosis?    Treatment has included ibuprofen for bad episodes, otherwise has not had much treatment    See above for Assessment and Plan

## 2024-01-20 DIAGNOSIS — K80.20 CALCULUS OF GALLBLADDER WITHOUT CHOLECYSTITIS WITHOUT OBSTRUCTION: ICD-10-CM

## 2024-01-20 DIAGNOSIS — K82.4 GALL BLADDER POLYP: Primary | ICD-10-CM

## 2024-01-20 NOTE — RESULT ENCOUNTER NOTE
Jasmeet    Thanks for doing the follow-up gallbladder ultrasound.  The radiologist states that it is a bit difficult to determine if the gallbladder polyp is a concern.  There are also gallstones noted.  The radiologist suggest a consultation with a general surgeon to determine the best way to manage this issue going forth.    I would suggest that you set up an appointment with Dr. Jr Mccormick in general surgery when you are back in town.  The scheduling number is [phone].  He will be able to review the ultrasound and determine how this should be followed down the road.    Thanks for doing the follow-up scan.  Looking forward to seeing you back this summer as scheduled.    Sincerely,  Aleksander Prabhakar MD

## 2024-01-23 ENCOUNTER — HOSPITAL ENCOUNTER (OUTPATIENT)
Dept: RADIOLOGY | Facility: CLINIC | Age: 74
Discharge: HOME | End: 2024-01-23
Payer: COMMERCIAL

## 2024-01-23 DIAGNOSIS — M47.816 SPONDYLOSIS OF LUMBAR JOINT: ICD-10-CM

## 2024-01-23 PROCEDURE — 72100 X-RAY EXAM L-S SPINE 2/3 VWS: CPT

## 2024-02-01 ENCOUNTER — OFFICE VISIT (OUTPATIENT)
Dept: SURGERY | Facility: CLINIC | Age: 74
End: 2024-02-01
Payer: COMMERCIAL

## 2024-02-01 VITALS
BODY MASS INDEX: 28.98 KG/M2 | DIASTOLIC BLOOD PRESSURE: 80 MMHG | HEIGHT: 71 IN | WEIGHT: 207 LBS | SYSTOLIC BLOOD PRESSURE: 130 MMHG

## 2024-02-01 DIAGNOSIS — K80.20 CALCULUS OF GALLBLADDER WITHOUT CHOLECYSTITIS WITHOUT OBSTRUCTION: ICD-10-CM

## 2024-02-01 DIAGNOSIS — K82.4 GALL BLADDER POLYP: ICD-10-CM

## 2024-02-01 PROCEDURE — 1160F RVW MEDS BY RX/DR IN RCRD: CPT | Performed by: SURGERY

## 2024-02-01 PROCEDURE — 1157F ADVNC CARE PLAN IN RCRD: CPT | Performed by: SURGERY

## 2024-02-01 PROCEDURE — 1159F MED LIST DOCD IN RCRD: CPT | Performed by: SURGERY

## 2024-02-01 PROCEDURE — 99204 OFFICE O/P NEW MOD 45 MIN: CPT | Performed by: SURGERY

## 2024-02-01 PROCEDURE — 1126F AMNT PAIN NOTED NONE PRSNT: CPT | Performed by: SURGERY

## 2024-02-01 PROCEDURE — 1036F TOBACCO NON-USER: CPT | Performed by: SURGERY

## 2024-02-01 ASSESSMENT — PAIN SCALES - GENERAL: PAINLEVEL: 0-NO PAIN

## 2024-02-01 NOTE — PROGRESS NOTES
Subjective   Patient ID: Uvaldo Domingo is a 73 y.o. male who presents for New Patient Visit (Cyst on gallbladder).  Patient has no complaints.  Patient was diagnosed with cholelithiasis and gallbladder polyp.  Surveillance ultrasound showed increase of the size of the polyp    HPI patient with history of A-fib, on Eliquis, presented with a history of gallbladder polyp, cholelithiasis.  Currently patient has no pain.  Surveillance ultrasound showed increase in size of the polyp up to 11 mm  Review of Systems review of full 14 system is negative  Physical Exam pupils equal bilaterally, oral mucosa moist, bilateral breath sounds, irregular rhythm and rate, abdomen soft, nontender.  Palpable reducible umbilical hernia.  No focal neurological motor deficits.  Musculoskeletal exam within normal limits, ENT exam within normal limits    Objective I reviewed all available data including lab results, radiological studies, previous reports and notes.  Ultrasound showed 7 x 9 mm polyp, cholelithiasis, liver lesions, most likely consistent with hemangiomas.  Common bile duct 4 mm.    Encounter Diagnoses   Name Primary?    Gall bladder polyp     Calculus of gallbladder without cholecystitis without obstruction       Patient Active Problem List   Diagnosis    Arthritis of knee, left    ASNHL (asymmetrical sensorineural hearing loss)    Benign enlargement of prostate    Cholelithiasis    Deviated nasal septum    Facial pressure    Gall bladder polyp    Gastroesophageal reflux disease with hiatal hernia    Hearing loss    Liver nodule    Male erectile disorder of organic origin    Ascending aortic aneurysm (CMS/HCC)    Mild ascending aorta dilatation (CMS/HCC)    Mitral regurgitation    Mixed conductive and sensorineural hearing loss of both ears    Overweight with body mass index (BMI) 25.0-29.9    Paroxysmal atrial flutter (CMS/HCC)    Prediabetes    Seasonal allergies    Tinnitus of right ear    Tubular adenoma of colon     Ultrasound scan abnormal    Ventral hernia      No Known Allergies   Medication Documentation Review Audit       Reviewed by Kd Chicas MD (Physician) on 02/01/24 at 0849      Medication Order Taking? Sig Documenting Provider Last Dose Status   atenolol (Tenormin) 25 mg tablet [id number] Yes Take 1 tablet (25 mg) by mouth once daily. Historical Provider, MD Taking Active   azelastine (Astelin) 137 mcg (0.1 %) nasal spray [id number] Yes INSTILL 2 SPRAY Twice daily each nostril Historical Provider, MD Taking Active   cholecalciferol (Vitamin D3) 50 MCG (2000 UT) tablet [id number] Yes Take 1 tablet (50 mcg) by mouth once daily. Aleksander Prabhakar MD Taking Active   Eliquis 5 mg tablet [id number] Yes Take 1 tablet (5 mg) by mouth 2 times a day. Historical Provider, MD Taking Active   fluticasone (Flonase) 50 mcg/actuation nasal spray [id number] Yes Administer into affected nostril(s). Historical Provider, MD Taking Active   methocarbamol (Robaxin-750) 750 mg tablet [id number] Yes Take 1 tablet (750 mg) by mouth 4 times a day for 20 days. Dominick Cheema MD Taking Active   simvastatin (Zocor) 20 mg tablet [id number] Yes Take by mouth. Historical Provider, MD Taking Active   tamsulosin (Flomax) 0.4 mg 24 hr capsule [id number] Yes Take by mouth. Historical Provider, MD Taking Active   traZODone (Desyrel) 50 mg tablet [id number] Yes Take 1 tablet (50 mg) by mouth once daily at bedtime. Aleksander Prabhakar MD Taking Active                    Past Medical History:   Diagnosis Date    Acute sinusitis, unspecified 11/12/2012    Acute sinusitis    Derangement of unspecified medial meniscus due to old tear or injury, right knee 11/14/2018    Old tear of medial meniscus of right knee, unspecified tear type    Effusion, left knee 08/03/2018    Effusion of left knee    Encounter for general adult medical examination without abnormal findings 07/08/2015    Encounter for Medicare annual wellness exam    Headache, unspecified 11/13/2018    Frontal  headache    Iliotibial band syndrome, right leg 10/01/2019    It band syndrome, right    Impaired fasting glucose 05/17/2019    Elevated fasting glucose    Osteoarthritis of knee, unspecified 01/19/2015    Osteoarthritis of knee    Other conditions influencing health status 01/28/2015    Cardiovascular Stress Test    Other specified diseases of liver 06/21/2022    Liver nodule    Pain in right hip 10/04/2017    Right hip pain    Pain in right knee 11/14/2018    Right knee pain    Pain in unspecified knee 04/30/2015    Knee pain    Palpitations 08/13/2014    Palpitations    Personal history of other diseases of the circulatory system 03/05/2018    History of orthostatic hypotension    Personal history of other diseases of the digestive system 06/21/2022    History of cholelithiasis    Personal history of other diseases of the respiratory system 01/29/2019    History of sinusitis    Personal history of urinary calculi 11/19/2019    History of renal calculi    Rheumatic tricuspid insufficiency 08/13/2014    Tricuspid regurgitation    Strain of muscle, fascia and tendon of right hip, subsequent encounter 10/25/2017    Strain of right hip, subsequent encounter    Thoracic aortic aneurysm, without rupture, unspecified (CMS/HCC) 07/31/2017    Thoracic aortic aneurysm without rupture     Social History     Tobacco Use   Smoking Status Never   Smokeless Tobacco Never     No family history on file.   Past Surgical History:   Procedure Laterality Date    COLONOSCOPY  12/03/2012    Complete Colonoscopy       Assessment/Plan     Patient with increased size of the gallbladder polyp, suspicion for neoplastic degeneration.  Also evidence of cholelithiasis.  The patient has indication for laparoscopic, possible open cholecystectomy.  Risks, benefits, alternative treatment were explained to the patient.  All questions were answered.  Informed consent was obtained.    Kd Chicas MD

## 2024-02-06 ENCOUNTER — APPOINTMENT (OUTPATIENT)
Dept: SURGERY | Facility: CLINIC | Age: 74
End: 2024-02-06
Payer: COMMERCIAL

## 2024-02-09 ENCOUNTER — EVALUATION (OUTPATIENT)
Dept: PHYSICAL THERAPY | Facility: CLINIC | Age: 74
End: 2024-02-09
Payer: COMMERCIAL

## 2024-02-09 DIAGNOSIS — M47.816 SPONDYLOSIS OF LUMBAR JOINT: ICD-10-CM

## 2024-02-09 DIAGNOSIS — M54.9 CHRONIC RIGHT-SIDED BACK PAIN: Primary | ICD-10-CM

## 2024-02-09 DIAGNOSIS — G89.29 CHRONIC RIGHT-SIDED BACK PAIN: Primary | ICD-10-CM

## 2024-02-09 PROCEDURE — 97110 THERAPEUTIC EXERCISES: CPT | Mod: GP | Performed by: PHYSICAL THERAPIST

## 2024-02-09 PROCEDURE — 97161 PT EVAL LOW COMPLEX 20 MIN: CPT | Mod: GP | Performed by: PHYSICAL THERAPIST

## 2024-02-09 ASSESSMENT — PAIN - FUNCTIONAL ASSESSMENT: PAIN_FUNCTIONAL_ASSESSMENT: 0-10

## 2024-02-09 ASSESSMENT — PAIN SCALES - GENERAL: PAINLEVEL_OUTOF10: 2

## 2024-02-09 NOTE — PROGRESS NOTES
Physical Therapy Evaluation    Patient Name: Uvaldo Domingo  MRN: [id number]  Today's Date: 2/11/2024  Time Calculation  Start Time: 0337  Stop Time: 0416  Time Calculation (min): 39 min    Visit #: 1  Visits approved: 60/yr.  Certification dates: 2/9/24-4/20/24.    Precautions:  Precautions  Precautions Comment: None. Low fall risk.    Subjective/History    Oswestry: 24%    DOI: 11/1/23.  Mechanism of injury: Insidious. Long (~2 yr) hx of LBP. Worse ~ DOI for no apparent reason.  Area of symptoms: Intermittent ache at right low back. Pain Assessment: 0-10  Pain Score: 2  Aggravating factors: Sit few min. Stand 30 min. In/out of car.  Easing factors: Massage.  Red flags: None.   Occupation: Retired.   Recreation/Hobbies/Sports: Sedentary.  Living situation: Unremarkable.   Barriers to treatment: none.   Preferred language: English.    Objective Findings  Observation/gait: Mild left upper LS convexity. Min LS lordosis.  AROM LS flex: Fingers to mid-tib- min LBP. Ext: ~15- stiff. SB: Fingers to knee bilat- right LBP each way.  SLR:~60 bilat- HS stretch- no change with DF.  Muscle activation/Resisted testing: TA activation: Poor.  Palpation: Tight bilat LS paraspinals,  IV- LS PA: Local pain and 9jt stiffness RPA L4-5, stiff throughout.    Treatment:   Therex: Self low LS right rot mob in hooklying x 2 min// SB: wnl bilat- decr pain- HEP 1-2 min, 2x/day as helpful.  Pelvic tilts- HEP 1 min, 2x/day.   TA hold- HEP throughout day.    Assessment  Patient presents with decr ROM, local pain and jt stiffness consistent with L4-5 dysfunction, multilevel LS DJD.    Plan  Physical therapy 1-2 times/week for 6/8 wks. Therapy may include the following: Therapeutic exercise, manual therapy, education/home program.     Goals: Sit 45 min through meal w/o pain. Stand 45 min for meal prep w/o pain. In/out of car w/o pain.

## 2024-02-16 ENCOUNTER — TREATMENT (OUTPATIENT)
Dept: PHYSICAL THERAPY | Facility: CLINIC | Age: 74
End: 2024-02-16
Payer: COMMERCIAL

## 2024-02-16 DIAGNOSIS — G89.29 CHRONIC RIGHT-SIDED BACK PAIN: ICD-10-CM

## 2024-02-16 DIAGNOSIS — M54.9 CHRONIC RIGHT-SIDED BACK PAIN: ICD-10-CM

## 2024-02-16 PROCEDURE — 97140 MANUAL THERAPY 1/> REGIONS: CPT | Mod: GP | Performed by: PHYSICAL THERAPIST

## 2024-02-16 PROCEDURE — 97110 THERAPEUTIC EXERCISES: CPT | Mod: GP | Performed by: PHYSICAL THERAPIST

## 2024-02-16 NOTE — PROGRESS NOTES
Physical Therapy Follow-up    Patient Name: Uvaldo Domingo  MRN: [id number]  Today's Date: 2/16/2024         Visit#: 2. 60/yr approved.  Cert dates: 2/9/24-4/20/24    Precautions:None.    Subjective: 1/10 resting pain today. Sitting, standing ~ same. HEP going well.     Objective: AROM LS flex: wnl. Ext: ~20- LBP. SB: wnl bilat- mild right LBP with left SB.    Treatment:   Therapeutic exercise: Self right low LS rot mob, pelvic tilt, TA hold- all demo'd correctly.  Bent knee fall-outs- HEP 1 min, 2x/day.  Sidelying leg lifts- HEP 15x, 2x/day.   Manual therapy: IV- PA, bilat transverse L2-5// Ext: ~20- decr pain. SB: wnl- decr pain.     Assessment: Decr sxs with rx. Demo's HEP correctly.    Plan: Incr LS stab exer.

## 2024-02-19 DIAGNOSIS — R35.1 BENIGN PROSTATIC HYPERPLASIA WITH NOCTURIA: ICD-10-CM

## 2024-02-19 DIAGNOSIS — N40.1 BENIGN PROSTATIC HYPERPLASIA WITH NOCTURIA: ICD-10-CM

## 2024-02-19 DIAGNOSIS — I48.92 PAROXYSMAL ATRIAL FLUTTER (MULTI): Primary | ICD-10-CM

## 2024-02-19 DIAGNOSIS — E78.5 DYSLIPIDEMIA, GOAL LDL BELOW 70: ICD-10-CM

## 2024-02-19 RX ORDER — SIMVASTATIN 20 MG/1
20 TABLET, FILM COATED ORAL NIGHTLY
Qty: 90 TABLET | Refills: 3 | Status: SHIPPED | OUTPATIENT
Start: 2024-02-19 | End: 2025-02-18

## 2024-02-19 RX ORDER — ATENOLOL 25 MG/1
25 TABLET ORAL DAILY
Qty: 90 TABLET | Refills: 3 | Status: SHIPPED | OUTPATIENT
Start: 2024-02-19 | End: 2025-02-18

## 2024-02-19 RX ORDER — TAMSULOSIN HYDROCHLORIDE 0.4 MG/1
0.4 CAPSULE ORAL DAILY
Qty: 90 CAPSULE | Refills: 3 | Status: SHIPPED | OUTPATIENT
Start: 2024-02-19 | End: 2025-02-18

## 2024-02-23 ENCOUNTER — TREATMENT (OUTPATIENT)
Dept: PHYSICAL THERAPY | Facility: CLINIC | Age: 74
End: 2024-02-23
Payer: COMMERCIAL

## 2024-02-23 DIAGNOSIS — G89.29 CHRONIC RIGHT-SIDED BACK PAIN: ICD-10-CM

## 2024-02-23 DIAGNOSIS — M54.9 CHRONIC RIGHT-SIDED BACK PAIN: ICD-10-CM

## 2024-02-23 PROCEDURE — 97140 MANUAL THERAPY 1/> REGIONS: CPT | Mod: GP | Performed by: PHYSICAL THERAPIST

## 2024-02-23 PROCEDURE — 97110 THERAPEUTIC EXERCISES: CPT | Mod: GP | Performed by: PHYSICAL THERAPIST

## 2024-02-23 NOTE — PROGRESS NOTES
Physical Therapy Follow-up    Patient Name: Uvaldo Domingo  MRN: [id number]  Today's Date: 2/23/2024         Visit#: 3. 60/yr approved.  Cert dates: 2/9/24-4/20/24    Precautions:None.    Subjective: 0/10 resting pain today. Sitting, standing slightly better. HEP going well.     Objective: AROM LS flex: wnl. Ext: ~20- min LBP. SB: wnl bilat- mild right LBP with left SB.    Treatment:   Therapeutic exercise: Self right low LS rot mob, pelvic tilt, TA hold, bent knee fall-outs, sidelying leg lifts- all demo'd correctly.  2-leg bridge.  Alternating 1-leg bridge- HEP 10x, 2x/day.  Tband row- HEP 20x, 2x/day.     Manual therapy: IV- PA, bilat transverse L2-5// SB: wnl- decr pain.     Assessment: Decr sxs with rx. Demo's HEP correctly.    Plan: Incr LS stab exer.

## 2024-02-28 ENCOUNTER — TREATMENT (OUTPATIENT)
Dept: PHYSICAL THERAPY | Facility: CLINIC | Age: 74
End: 2024-02-28
Payer: COMMERCIAL

## 2024-02-28 DIAGNOSIS — G89.29 CHRONIC RIGHT-SIDED BACK PAIN: ICD-10-CM

## 2024-02-28 DIAGNOSIS — M54.9 CHRONIC RIGHT-SIDED BACK PAIN: ICD-10-CM

## 2024-02-28 PROCEDURE — 97110 THERAPEUTIC EXERCISES: CPT | Mod: GP | Performed by: PHYSICAL THERAPIST

## 2024-02-28 NOTE — PROGRESS NOTES
Physical Therapy Follow-up    Patient Name: Uvaldo Domingo  MRN: [id number]  Today's Date: 2/28/2024         Visit#: 4. 60/yr approved.  Cert dates: 2/9/24-4/20/24    Precautions:None.    Subjective: 0/10 resting pain today. Able to sit and stand 45 w/o pain. Getting in/out of car wnl. HEP going well. Thinks he is ready for self management.    Objective: AROM LS flex: wnl. Ext: ~20- min LBP. SB: wnl bilat- mild right LBP with left SB.    Treatment:   Therapeutic exercise: Self right low LS rot mob, pelvic tilt, TA hold, bent knee fall-outs, sidelying leg lifts, alternating 1-leg bridge, Tband row - all demo'd correctly.  Pallof press with green band- HEP 20x, 2x/day bilat.    Assessment: Demo's HEP correctly. Ready for self management.    Plan: discharge with HEP.

## 2024-04-25 ENCOUNTER — TELEPHONE (OUTPATIENT)
Dept: PRIMARY CARE | Facility: CLINIC | Age: 74
End: 2024-04-25
Payer: COMMERCIAL

## 2024-05-01 DIAGNOSIS — R73.09 ELEVATED GLUCOSE: ICD-10-CM

## 2024-05-01 DIAGNOSIS — E55.9 VITAMIN D DEFICIENCY: Primary | ICD-10-CM

## 2024-05-01 DIAGNOSIS — R35.1 BENIGN PROSTATIC HYPERPLASIA WITH NOCTURIA: ICD-10-CM

## 2024-05-01 DIAGNOSIS — E78.5 DYSLIPIDEMIA, GOAL LDL BELOW 70: ICD-10-CM

## 2024-05-01 DIAGNOSIS — I48.92 PAROXYSMAL ATRIAL FLUTTER (MULTI): ICD-10-CM

## 2024-05-01 DIAGNOSIS — I10 ESSENTIAL HYPERTENSION WITH GOAL BLOOD PRESSURE LESS THAN 130/85: ICD-10-CM

## 2024-05-01 DIAGNOSIS — N40.1 BENIGN PROSTATIC HYPERPLASIA WITH NOCTURIA: ICD-10-CM

## 2024-05-14 DIAGNOSIS — I48.92 UNSPECIFIED ATRIAL FLUTTER (MULTI): ICD-10-CM

## 2024-05-15 RX ORDER — APIXABAN 5 MG/1
5 TABLET, FILM COATED ORAL 2 TIMES DAILY
Qty: 180 TABLET | Refills: 3 | Status: SHIPPED | OUTPATIENT
Start: 2024-05-15 | End: 2024-06-03 | Stop reason: SDUPTHER

## 2024-05-22 ENCOUNTER — TELEPHONE (OUTPATIENT)
Dept: GASTROENTEROLOGY | Facility: CLINIC | Age: 74
End: 2024-05-22
Payer: COMMERCIAL

## 2024-05-22 NOTE — TELEPHONE ENCOUNTER
Patient called stating he is having a flare of what he believes to be diverticulitis. I told him that at this time Dr. Gay is booked out until July and the best thing we can do is have him proceed to the ER so they can order stat testing and treat as needed.

## 2024-05-24 ENCOUNTER — OFFICE VISIT (OUTPATIENT)
Dept: SURGERY | Facility: CLINIC | Age: 74
End: 2024-05-24
Payer: COMMERCIAL

## 2024-05-24 DIAGNOSIS — K59.00 CONSTIPATION, UNSPECIFIED CONSTIPATION TYPE: ICD-10-CM

## 2024-05-24 DIAGNOSIS — K57.92 DIVERTICULITIS: ICD-10-CM

## 2024-05-24 DIAGNOSIS — Z90.49 STATUS POST LAPAROSCOPIC CHOLECYSTECTOMY: Primary | ICD-10-CM

## 2024-05-24 PROCEDURE — 1160F RVW MEDS BY RX/DR IN RCRD: CPT | Performed by: PHYSICIAN ASSISTANT

## 2024-05-24 PROCEDURE — 1159F MED LIST DOCD IN RCRD: CPT | Performed by: PHYSICIAN ASSISTANT

## 2024-05-24 PROCEDURE — 99024 POSTOP FOLLOW-UP VISIT: CPT | Performed by: PHYSICIAN ASSISTANT

## 2024-05-24 PROCEDURE — 1036F TOBACCO NON-USER: CPT | Performed by: PHYSICIAN ASSISTANT

## 2024-05-24 NOTE — PROGRESS NOTES
Subjective   Patient ID: Uvaldo Domingo is a 73 y.o. male who presents for Post-op (Lap barry w grams poss open done on 5/10/24).    HPI  This is a 73-year-old male who on May 10, 2024 underwent a laparoscopic cholecystectomy and pancreatic duct stent due to gallstones.  Patient was doing well up until 2 days ago start experiencing left lower quadrant pain went to the emergency room was found to have acute diverticulitis this is the first time he is ever had it in his lifetime.  His last colonoscopy was in October 2023 in [facility] where he lives half of the year.  Patient was placed on Cipro and Flagyl from the emergency department.  He has been doing well since then prior to going to the hospital though he also complained of constipation in the ER he had not gone for 2 days.  Currently he has not gone for 4 days.  He is taking Metamucil and Colace that he was told to take postoperatively.  He has some bloating but no pain no nausea no vomiting.    Review of Systems  Review of systems is negative other than what is mentioned above        Physical Exam  Eyes: Conjunctiva non -icteric and eye lids are without obvious rash or drooping. Pupils are symmetric.   Ears, Nose, Mouth, and Throat: External ears and nose appear to be without deformity or rash. No lesions or masses noted. Hearing is grossly intact.   Neck:. No JVD noted, tracheal position is midline. No thyromegaly, no thyroid nodules  Head and Face: Examination of the head and face revealed no abnormalities.   Respiratory: No gasping or shortness of breath noted, no use of accessory muscles noted.  Lungs are clear to auscultate  Cardiovascular: Examination for edema is normal, regular rate and rhythm S1-S2  GI: Abdomen non tender to palpation, bowel sounds are present, incisions are clean dry and intact no erythema no swelling no drainage his abdomen is slightly distended but nontender.  Bowel sounds are present.  Patient states he is passing flatulence  regularly  Skin: No rashes or open lesions/ulcers identified on skin.   Musk: Digits/nails show no clubbing or cyanosis. No asymmetry or masses noted of the musculature. Examination of the muscles/joints/bones show normal range of motion. Gait is grossly normally.   Neurologic: Cranial nerves II- XII intact, motor strength 5/5 muscle strength of the lower extremities bilaterally and equal.      Objective     No diagnosis found.   Patient Active Problem List   Diagnosis    Arthritis of knee, left    ASNHL (asymmetrical sensorineural hearing loss)    Benign enlargement of prostate    Cholelithiasis    Deviated nasal septum    Facial pressure    Gall bladder polyp    Gastroesophageal reflux disease with hiatal hernia    Hearing loss    Liver nodule    Male erectile disorder of organic origin    Ascending aortic aneurysm (CMS-HCC)    Mild ascending aorta dilatation (CMS-HCC)    Mitral regurgitation    Mixed conductive and sensorineural hearing loss of both ears    Overweight with body mass index (BMI) 25.0-29.9    Paroxysmal atrial flutter (Multi)    Prediabetes    Seasonal allergies    Tinnitus of right ear    Tubular adenoma of colon    Ultrasound scan abnormal    Ventral hernia    Chronic right-sided back pain      No Known Allergies   Medication Documentation Review Audit       Reviewed by Lili Scott MA (Medical Assistant) on 05/24/24 at 1131      Medication Order Taking? Sig Documenting Provider Last Dose Status   atenolol (Tenormin) 25 mg tablet [id number]  Take 1 tablet (25 mg) by mouth once daily. Aleksander Prabhakar MD  Active   azelastine (Astelin) 137 mcg (0.1 %) nasal spray [id number] No INSTILL 2 SPRAY Twice daily each nostril Historical Provider, MD Taking Active   cholecalciferol (Vitamin D3) 50 MCG (2000 UT) tablet [id number] No Take 1 tablet (50 mcg) by mouth once daily. Aleksander Prabhakar MD Taking Active   Eliquis 5 mg tablet [id number]  TAKE 1 TABLET TWICE DAILY. Maryjane Perez MD  Active   fluticasone  (Flonase) 50 mcg/actuation nasal spray [id number] No Administer into affected nostril(s). Historical Rose Marie, MD Taking Active   methocarbamol (Robaxin-750) 750 mg tablet [id number] No Take 1 tablet (750 mg) by mouth 4 times a day for 20 days. Dominick Cheema MD Taking  24 2359   simvastatin (Zocor) 20 mg tablet [id number]  Take 1 tablet (20 mg) by mouth once daily at bedtime. Aleksander Prabhakar MD  Active   tamsulosin (Flomax) 0.4 mg 24 hr capsule [id number]  Take 1 capsule (0.4 mg) by mouth once daily. Aleksander Prabhakar MD  Active   traZODone (Desyrel) 50 mg tablet [id number] No Take 1 tablet (50 mg) by mouth once daily at bedtime. Aleksander Prabhakar MD Taking Active                    Past Medical History:   Diagnosis Date    Acute sinusitis, unspecified 2012    Acute sinusitis    Derangement of unspecified medial meniscus due to old tear or injury, right knee 2018    Old tear of medial meniscus of right knee, unspecified tear type    Effusion, left knee 2018    Effusion of left knee    Encounter for general adult medical examination without abnormal findings 2015    Encounter for Medicare annual wellness exam    Headache, unspecified 2018    Frontal headache    Iliotibial band syndrome, right leg 10/01/2019    It band syndrome, right    Impaired fasting glucose 2019    Elevated fasting glucose    Osteoarthritis of knee, unspecified 2015    Osteoarthritis of knee    Other conditions influencing health status 2015    Cardiovascular Stress Test    Other specified diseases of liver 2022    Liver nodule    Pain in right hip 10/04/2017    Right hip pain    Pain in right knee 2018    Right knee pain    Pain in unspecified knee 2015    Knee pain    Palpitations 2014    Palpitations    Personal history of other diseases of the circulatory system 2018    History of orthostatic hypotension    Personal history of other diseases of the digestive system  06/21/2022    History of cholelithiasis    Personal history of other diseases of the respiratory system 01/29/2019    History of sinusitis    Personal history of urinary calculi 11/19/2019    History of renal calculi    Rheumatic tricuspid insufficiency 08/13/2014    Tricuspid regurgitation    Strain of muscle, fascia and tendon of right hip, subsequent encounter 10/25/2017    Strain of right hip, subsequent encounter    Thoracic aortic aneurysm, without rupture, unspecified (CMS-[facility]) 07/31/2017    Thoracic aortic aneurysm without rupture     Social History     Tobacco Use   Smoking Status Never   Smokeless Tobacco Never     No family history on file.   Past Surgical History:   Procedure Laterality Date    COLONOSCOPY  12/03/2012    Complete Colonoscopy       Assessment/Plan   No lifting over 10 to 12 pounds for 4 weeks  No swimming pools hot tubs or lakes for 2 weeks  You may ride a stationary bike, you may use a treadmill, you may walk outside.  No squats, sit ups or lunges  You may drive a car    Given the patient's most recent and first episode of diverticulitis and 4 days of constipation.  I have encouraged patient to purchase MiraLAX in order to relieve his constipation.  I have ordered him to stop the Metamucil that this could be contributing to his constipation.  I have instructed the patient if he develops any worsening abdominal pain nausea and vomiting that he should go to the emergency department because this could be a sign of bowel obstruction.    Encounter Diagnoses   Name Primary?    Status post laparoscopic cholecystectomy Yes    Diverticulitis     Constipation, unspecified constipation type        **Portions of this medical record have been created using voice recognition software and may have minor errors which are inherent in voice recognition systems. It has not been fully edited for typographical or grammatical errors**

## 2024-06-03 DIAGNOSIS — I48.92 UNSPECIFIED ATRIAL FLUTTER (MULTI): ICD-10-CM

## 2024-06-24 ENCOUNTER — LAB (OUTPATIENT)
Dept: LAB | Facility: LAB | Age: 74
End: 2024-06-24
Payer: COMMERCIAL

## 2024-06-24 ENCOUNTER — APPOINTMENT (OUTPATIENT)
Dept: PRIMARY CARE | Facility: CLINIC | Age: 74
End: 2024-06-24
Payer: COMMERCIAL

## 2024-06-24 DIAGNOSIS — N40.1 BENIGN PROSTATIC HYPERPLASIA WITH NOCTURIA: ICD-10-CM

## 2024-06-24 DIAGNOSIS — R35.1 BENIGN PROSTATIC HYPERPLASIA WITH NOCTURIA: ICD-10-CM

## 2024-06-24 DIAGNOSIS — E55.9 VITAMIN D DEFICIENCY: ICD-10-CM

## 2024-06-24 DIAGNOSIS — R73.09 ELEVATED GLUCOSE: ICD-10-CM

## 2024-06-24 DIAGNOSIS — E78.5 DYSLIPIDEMIA, GOAL LDL BELOW 70: ICD-10-CM

## 2024-06-24 DIAGNOSIS — I48.92 PAROXYSMAL ATRIAL FLUTTER (MULTI): ICD-10-CM

## 2024-06-24 DIAGNOSIS — I71.21 ANEURYSM OF ASCENDING AORTA WITHOUT RUPTURE (CMS-HCC): ICD-10-CM

## 2024-06-24 LAB
25(OH)D3 SERPL-MCNC: 45 NG/ML (ref 30–100)
ALBUMIN SERPL BCP-MCNC: 4 G/DL (ref 3.4–5)
ALP SERPL-CCNC: 35 U/L (ref 33–136)
ALT SERPL W P-5'-P-CCNC: 19 U/L (ref 10–52)
ANION GAP SERPL CALC-SCNC: 14 MMOL/L (ref 10–20)
AST SERPL W P-5'-P-CCNC: 17 U/L (ref 9–39)
BILIRUB SERPL-MCNC: 0.5 MG/DL (ref 0–1.2)
BUN SERPL-MCNC: 15 MG/DL (ref 6–23)
CALCIUM SERPL-MCNC: 8.9 MG/DL (ref 8.6–10.6)
CHLORIDE SERPL-SCNC: 107 MMOL/L (ref 98–107)
CHOLEST SERPL-MCNC: 136 MG/DL (ref 0–199)
CHOLESTEROL/HDL RATIO: 2.4
CO2 SERPL-SCNC: 25 MMOL/L (ref 21–32)
CREAT SERPL-MCNC: 0.81 MG/DL (ref 0.5–1.3)
EGFRCR SERPLBLD CKD-EPI 2021: >90 ML/MIN/1.73M*2
ERYTHROCYTE [DISTWIDTH] IN BLOOD BY AUTOMATED COUNT: 12.8 % (ref 11.5–14.5)
EST. AVERAGE GLUCOSE BLD GHB EST-MCNC: 117 MG/DL
GLUCOSE SERPL-MCNC: 95 MG/DL (ref 74–99)
HBA1C MFR BLD: 5.7 %
HCT VFR BLD AUTO: 40.1 % (ref 41–52)
HDLC SERPL-MCNC: 57.6 MG/DL
HGB BLD-MCNC: 13.4 G/DL (ref 13.5–17.5)
LDLC SERPL CALC-MCNC: 59 MG/DL
MCH RBC QN AUTO: 30.3 PG (ref 26–34)
MCHC RBC AUTO-ENTMCNC: 33.4 G/DL (ref 32–36)
MCV RBC AUTO: 91 FL (ref 80–100)
NON HDL CHOLESTEROL: 78 MG/DL (ref 0–149)
NRBC BLD-RTO: 0 /100 WBCS (ref 0–0)
PHOSPHATE SERPL-MCNC: 3.8 MG/DL (ref 2.5–4.9)
PLATELET # BLD AUTO: 143 X10*3/UL (ref 150–450)
POTASSIUM SERPL-SCNC: 4.2 MMOL/L (ref 3.5–5.3)
PROT SERPL-MCNC: 6.4 G/DL (ref 6.4–8.2)
PSA SERPL-MCNC: 1.39 NG/ML
RBC # BLD AUTO: 4.42 X10*6/UL (ref 4.5–5.9)
SODIUM SERPL-SCNC: 142 MMOL/L (ref 136–145)
TRIGL SERPL-MCNC: 99 MG/DL (ref 0–149)
TSH SERPL-ACNC: 2.96 MIU/L (ref 0.44–3.98)
VLDL: 20 MG/DL (ref 0–40)
WBC # BLD AUTO: 4.4 X10*3/UL (ref 4.4–11.3)

## 2024-06-24 PROCEDURE — 84100 ASSAY OF PHOSPHORUS: CPT

## 2024-06-24 PROCEDURE — 83036 HEMOGLOBIN GLYCOSYLATED A1C: CPT

## 2024-06-24 PROCEDURE — 80053 COMPREHEN METABOLIC PANEL: CPT

## 2024-06-24 PROCEDURE — 84153 ASSAY OF PSA TOTAL: CPT

## 2024-06-24 PROCEDURE — 36415 COLL VENOUS BLD VENIPUNCTURE: CPT

## 2024-06-24 PROCEDURE — 80061 LIPID PANEL: CPT

## 2024-06-24 PROCEDURE — 85027 COMPLETE CBC AUTOMATED: CPT

## 2024-06-24 PROCEDURE — 84443 ASSAY THYROID STIM HORMONE: CPT

## 2024-06-24 PROCEDURE — 82306 VITAMIN D 25 HYDROXY: CPT

## 2024-07-01 ENCOUNTER — HOSPITAL ENCOUNTER (OUTPATIENT)
Dept: RADIOLOGY | Facility: CLINIC | Age: 74
Discharge: HOME | End: 2024-07-01
Payer: COMMERCIAL

## 2024-07-01 DIAGNOSIS — I71.21 ANEURYSM OF ASCENDING AORTA WITHOUT RUPTURE (CMS-HCC): ICD-10-CM

## 2024-07-01 PROCEDURE — 71275 CT ANGIOGRAPHY CHEST: CPT

## 2024-07-01 PROCEDURE — 71275 CT ANGIOGRAPHY CHEST: CPT | Performed by: RADIOLOGY

## 2024-07-01 PROCEDURE — 2550000001 HC RX 255 CONTRASTS

## 2024-07-02 ENCOUNTER — TELEPHONE (OUTPATIENT)
Dept: CARDIOLOGY | Facility: CLINIC | Age: 74
End: 2024-07-02
Payer: COMMERCIAL

## 2024-07-02 DIAGNOSIS — M47.816 SPONDYLOSIS OF LUMBAR JOINT: ICD-10-CM

## 2024-07-02 RX ORDER — METHOCARBAMOL 750 MG/1
750 TABLET, FILM COATED ORAL 4 TIMES DAILY
Qty: 45 TABLET | Refills: 1 | Status: SHIPPED | OUTPATIENT
Start: 2024-07-02 | End: 2024-08-01

## 2024-07-02 NOTE — TELEPHONE ENCOUNTER
----- Message from Maryjane Perez MD sent at 7/1/2024  6:18 PM EDT -----  Please let patient know CTA chest shows stable aorta measuring at 4 cm. We will discuss more in detail at his appointment with me next week

## 2024-07-02 NOTE — TELEPHONE ENCOUNTER
Result Communication    Resulted Orders   CT angio chest w and wo IV contrast    Narrative    Interpreted By:  Conor Hudson,   STUDY:  CT ANGIO CHEST W AND WO IV CONTRAST;  7/1/2024 8:58 am      INDICATION:  Signs/Symptoms:Ascending aortic aneurysm monitoring.      COMPARISON:  CT dated 11/10/2021      ACCESSION NUMBER(S):  [id number]      ORDERING CLINICIAN:  SEBLE GUZMAN      TECHNIQUE:  Using multi-detector CT technology, axial, sequential imaging with  prospective gating was performed of the chest following the  intravenous administration of 70 cc of Omnipaque 350 N/A.      For optimization of anatomic evaluation, multiplanar reconstruction,  maximum intensity projections, and advanced 3-D off-line  postprocessing were performed on a dedicated stand-alone workstation  by the interpreting physician.      FINDINGS:  Potential study limitations:  None.      THORACIC AORTA:  Mildly dilated aortic root measuring 4.0 cm at the level sinus of  Valsalva. No significant change. The sinotubular junction is  preserved. There is no evidence for acute aortic pathology, such as  dissection, intramural hematoma, or contained rupture. The arch  vessel branching pattern is  conventional.  All of the arch branch  vessels appear widely patent in their proximal portions. Visualized  proximal abdominal aorta is normal. The celiac trunk, SMA and  bilateral renal arteries are normal in caliber.      REPRESENTATIVE MEASUREMENTS OF THE AORTA:  Annulus  2.8 x  3.4 cm  Root (Sinus of Valsalva)  4.0 cm  Sinotubular junction  3.2 cm  Mid ascending  3.5 cm  Distal ascending  3.4 cm  Mid transverse arch  2.9 cm  Isthmus  2.5 cm  Mid descending  2.4 cm  Distal descending (hiatus)  2.4 cm      CORONARY ARTERIES:  The examination is not optimized for assessment of the coronary  arteries. Normal coronary artery origins.  Right dominant system.          PULMONARY ARTERIES:  The central pulmonary arteries appear  normal       SYSTEMIC AND PULMONARY VEINS:  Normal systemic venous and pulmonary venous return.  The SVC and IVC are of normal caliber.  Normal pulmonary venous anatomy.      CARDIAC CHAMBERS:  Normal atrioventricular and ventriculoarterial concordance.      Cardiac chambers are not enlarged.      INTERVENTRICULAR SEPTUM:  Intact.      AORTIC VALVE:  The aortic valve is  trileaflet in morphology.  No calcifications.      MITRAL VALVE:  Mild calcification of the mitral valve annulus.      PERICARDIUM:  There is no pericardial effusion or thickening.      CHEST:  Trachea and central airways are patent. No endobronchial lesion.  There is no focal pulmonary mass or consolidation. No pleural  effusion or pneumothorax. There is no significant axillary or  mediastinal lymph nodes. No hilar adenopathy. Visualized thyroid is  intact. Esophagus is normal.      UPPER ABDOMEN:  Above small hypodense lesions are identified in the liver, too small  to be definitely characterized but likely representing cyst or  hemangioma. Status post cholecystectomy.      BONE AND SOFT TISSUE:  No suspicious osseous abnormality.        Impression    1.  Mildly dilated aortic root measuring 4.0 cm at the level of sinus  of Valsalva, unchanged compared to prior study. The rest of the  thoracic aorta is normal in caliber as detailed above. No evidence of  acute aortic pathology.          MACRO:  None      Signed by: Conor Mondragon 7/1/2024 9:23 AM  Dictation workstation:   [id number]       9:05 AM    Phoned patient and spoke to patient.  Provided results and plan of care per Dr. Perez notation and patient verbalized understanding.

## 2024-07-03 PROBLEM — K57.92 ACUTE DIVERTICULITIS: Status: ACTIVE | Noted: 2024-05-22

## 2024-07-03 PROBLEM — R91.8 MULTIPLE PULMONARY NODULES: Status: ACTIVE | Noted: 2024-05-22

## 2024-07-05 ENCOUNTER — APPOINTMENT (OUTPATIENT)
Dept: PRIMARY CARE | Facility: CLINIC | Age: 74
End: 2024-07-05
Payer: COMMERCIAL

## 2024-07-05 VITALS
BODY MASS INDEX: 28.89 KG/M2 | OXYGEN SATURATION: 95 % | DIASTOLIC BLOOD PRESSURE: 76 MMHG | HEART RATE: 57 BPM | SYSTOLIC BLOOD PRESSURE: 114 MMHG | HEIGHT: 70 IN | WEIGHT: 201.8 LBS | TEMPERATURE: 97.9 F

## 2024-07-05 DIAGNOSIS — K57.92 ACUTE DIVERTICULITIS: ICD-10-CM

## 2024-07-05 DIAGNOSIS — M54.50 CHRONIC RIGHT-SIDED LOW BACK PAIN WITHOUT SCIATICA: ICD-10-CM

## 2024-07-05 DIAGNOSIS — Z00.00 ANNUAL PHYSICAL EXAM: Primary | ICD-10-CM

## 2024-07-05 DIAGNOSIS — E66.3 OVERWEIGHT WITH BODY MASS INDEX (BMI) 25.0-29.9: ICD-10-CM

## 2024-07-05 DIAGNOSIS — R35.1 BENIGN PROSTATIC HYPERPLASIA WITH NOCTURIA: ICD-10-CM

## 2024-07-05 DIAGNOSIS — G47.09 OTHER INSOMNIA: ICD-10-CM

## 2024-07-05 DIAGNOSIS — N40.1 BENIGN PROSTATIC HYPERPLASIA WITH NOCTURIA: ICD-10-CM

## 2024-07-05 DIAGNOSIS — I48.92 PAROXYSMAL ATRIAL FLUTTER (MULTI): ICD-10-CM

## 2024-07-05 DIAGNOSIS — G89.29 CHRONIC RIGHT-SIDED LOW BACK PAIN WITHOUT SCIATICA: ICD-10-CM

## 2024-07-05 DIAGNOSIS — D64.9 MILD ANEMIA: ICD-10-CM

## 2024-07-05 DIAGNOSIS — D12.6 TUBULAR ADENOMA OF COLON: ICD-10-CM

## 2024-07-05 DIAGNOSIS — M17.12 ARTHRITIS OF KNEE, LEFT: ICD-10-CM

## 2024-07-05 DIAGNOSIS — R73.03 PREDIABETES: ICD-10-CM

## 2024-07-05 DIAGNOSIS — I10 ESSENTIAL HYPERTENSION WITH GOAL BLOOD PRESSURE LESS THAN 130/85: ICD-10-CM

## 2024-07-05 DIAGNOSIS — I71.21 ANEURYSM OF ASCENDING AORTA WITHOUT RUPTURE (CMS-HCC): ICD-10-CM

## 2024-07-05 PROBLEM — K80.20 CHOLELITHIASIS: Status: RESOLVED | Noted: 2023-06-22 | Resolved: 2024-07-05

## 2024-07-05 PROBLEM — K82.4 GALL BLADDER POLYP: Status: RESOLVED | Noted: 2023-06-22 | Resolved: 2024-07-05

## 2024-07-05 PROCEDURE — 1159F MED LIST DOCD IN RCRD: CPT | Performed by: INTERNAL MEDICINE

## 2024-07-05 PROCEDURE — 99397 PER PM REEVAL EST PAT 65+ YR: CPT | Performed by: INTERNAL MEDICINE

## 2024-07-05 PROCEDURE — 1160F RVW MEDS BY RX/DR IN RCRD: CPT | Performed by: INTERNAL MEDICINE

## 2024-07-05 PROCEDURE — 1123F ACP DISCUSS/DSCN MKR DOCD: CPT | Performed by: INTERNAL MEDICINE

## 2024-07-05 PROCEDURE — 3078F DIAST BP <80 MM HG: CPT | Performed by: INTERNAL MEDICINE

## 2024-07-05 PROCEDURE — 3074F SYST BP LT 130 MM HG: CPT | Performed by: INTERNAL MEDICINE

## 2024-07-05 RX ORDER — TRAZODONE HYDROCHLORIDE 50 MG/1
50 TABLET ORAL NIGHTLY PRN
Status: SHIPPED | COMMUNITY
Start: 2024-07-05

## 2024-07-05 ASSESSMENT — ENCOUNTER SYMPTOMS
OCCASIONAL FEELINGS OF UNSTEADINESS: 0
LOSS OF SENSATION IN FEET: 0
DEPRESSION: 0

## 2024-07-05 ASSESSMENT — PATIENT HEALTH QUESTIONNAIRE - PHQ9
2. FEELING DOWN, DEPRESSED OR HOPELESS: NOT AT ALL
SUM OF ALL RESPONSES TO PHQ9 QUESTIONS 1 AND 2: 0
1. LITTLE INTEREST OR PLEASURE IN DOING THINGS: NOT AT ALL

## 2024-07-05 NOTE — PROGRESS NOTES
"Subjective   Patient ID: Uvaldo Domingo is a 73 y.o. male who presents for Annual Exam.    Here for wellness visit  Overall doing well.  He did have his gallbladder removed earlier this year.  He had difficulty with constipation after surgery with diverticulitis which he attributes to insufficient fluid with the Metamucil.    Improved presently.  He is working to optimize his diet.  His weight is down.  He has been both biking and walking, he rarely uses alcohol presently, occasional glass of wine but otherwise uses nonalcoholic beer and gave up hard liquor he notes.         Review of Systems    Objective   /76   Pulse 57   Temp 36.6 °C (97.9 °F)   Ht 1.778 m (5' 10\")   Wt 91.5 kg (201 lb 12.8 oz)   SpO2 95%   BMI 28.96 kg/m²     Physical Exam  Vitals reviewed.   Constitutional:       Appearance: Normal appearance.   HENT:      Head: Normocephalic and atraumatic.   Eyes:      General: No scleral icterus.        Right eye: No discharge.         Left eye: No discharge.      Extraocular Movements: Extraocular movements intact.      Conjunctiva/sclera: Conjunctivae normal.      Pupils: Pupils are equal, round, and reactive to light.   Cardiovascular:      Rate and Rhythm: Normal rate and regular rhythm.      Pulses: Normal pulses.      Heart sounds: Normal heart sounds. No murmur heard.  Pulmonary:      Effort: Pulmonary effort is normal.      Breath sounds: Normal breath sounds. No wheezing or rhonchi.   Musculoskeletal:         General: No deformity or signs of injury. Normal range of motion.      Cervical back: Normal range of motion and neck supple. No rigidity or tenderness.   Lymphadenopathy:      Cervical: No cervical adenopathy.   Skin:     General: Skin is warm and dry.      Findings: No rash.   Neurological:      General: No focal deficit present.      Mental Status: He is alert and oriented to person, place, and time. Mental status is at baseline.      Cranial Nerves: No cranial nerve deficit.    "   Sensory: No sensory deficit.      Gait: Gait normal.   Psychiatric:         Mood and Affect: Mood normal.         Behavior: Behavior normal.         Thought Content: Thought content normal.         Judgment: Judgment normal.         Assessment/Plan   Problem List Items Addressed This Visit             ICD-10-CM    Arthritis of knee, left M17.12    Benign enlargement of prostate N40.0    Ascending aortic aneurysm (CMS-HCC) I71.21    Overweight with body mass index (BMI) 25.0-29.9 E66.3    Paroxysmal atrial flutter (Multi) I48.92    Prediabetes R73.03    Tubular adenoma of colon D12.6    Chronic right-sided back pain M54.9, G89.29    RESOLVED: Acute diverticulitis K57.92    Annual physical exam - Primary Z00.00     Other Visit Diagnoses         Codes    Essential hypertension with goal blood pressure less than 130/85     I10    Other insomnia     G47.09    Relevant Medications    traZODone (Desyrel) 50 mg tablet    Mild anemia     D64.9    Relevant Orders    CBC    Iron and TIBC    Vitamin B12    Folate            Lab on 06/24/2024   Component Date Value Ref Range Status    WBC 06/24/2024 4.4  4.4 - 11.3 x10*3/uL Final    nRBC 06/24/2024 0.0  0.0 - 0.0 /100 WBCs Final    RBC 06/24/2024 4.42 (L)  4.50 - 5.90 x10*6/uL Final    Hemoglobin 06/24/2024 13.4 (L)  13.5 - 17.5 g/dL Final    Hematocrit 06/24/2024 40.1 (L)  41.0 - 52.0 % Final    MCV 06/24/2024 91  80 - 100 fL Final    MCH 06/24/2024 30.3  26.0 - 34.0 pg Final    MCHC 06/24/2024 33.4  32.0 - 36.0 g/dL Final    RDW 06/24/2024 12.8  11.5 - 14.5 % Final    Platelets 06/24/2024 143 (L)  150 - 450 x10*3/uL Final    Glucose 06/24/2024 95  74 - 99 mg/dL Final    Sodium 06/24/2024 142  136 - 145 mmol/L Final    Potassium 06/24/2024 4.2  3.5 - 5.3 mmol/L Final    Chloride 06/24/2024 107  98 - 107 mmol/L Final    Bicarbonate 06/24/2024 25  21 - 32 mmol/L Final    Anion Gap 06/24/2024 14  10 - 20 mmol/L Final    Urea Nitrogen 06/24/2024 15  6 - 23 mg/dL Final     Creatinine 06/24/2024 0.81  0.50 - 1.30 mg/dL Final    eGFR 06/24/2024 >90  >60 mL/min/1.73m*2 Final    Calculations of estimated GFR are performed using the 2021 CKD-EPI Study Refit equation without the race variable for the IDMS-Traceable creatinine methods.  https://jasn.asnjournals.org/content/early/2021/09/22/ASN.3042589497    Calcium 06/24/2024 8.9  8.6 - 10.6 mg/dL Final    Albumin 06/24/2024 4.0  3.4 - 5.0 g/dL Final    Alkaline Phosphatase 06/24/2024 35  33 - 136 U/L Final    Total Protein 06/24/2024 6.4  6.4 - 8.2 g/dL Final    AST 06/24/2024 17  9 - 39 U/L Final    Bilirubin, Total 06/24/2024 0.5  0.0 - 1.2 mg/dL Final    ALT 06/24/2024 19  10 - 52 U/L Final    Patients treated with Sulfasalazine may generate falsely decreased results for ALT.    Prostate Specific AG 06/24/2024 1.39  <=4.00 ng/mL Final    Thyroid Stimulating Hormone 06/24/2024 2.96  0.44 - 3.98 mIU/L Final    Hemoglobin A1C 06/24/2024 5.7 (H)  see below % Final    Estimated Average Glucose 06/24/2024 117  Not Established mg/dL Final    Cholesterol 06/24/2024 136  0 - 199 mg/dL Final          Age      Desirable   Borderline High   High     0-19 Y     0 - 169       170 - 199     >/= 200    20-24 Y     0 - 189       190 - 224     >/= 225         >24 Y     0 - 199       200 - 239     >/= 240   **All ranges are based on fasting samples. Specific   therapeutic targets will vary based on patient-specific   cardiac risk.    Pediatric guidelines reference:Pediatrics 2011, 128(S5).Adult guidelines reference: NCEP ATPIII Guidelines,TAMIKO 2001, 258:2486-97    Venipuncture immediately after or during the administration of Metamizole may lead to falsely low results. Testing should be performed immediately prior to Metamizole dosing.    HDL-Cholesterol 06/24/2024 57.6  mg/dL Final      Age       Very Low   Low     Normal    High    0-19 Y    < 35      < 40     40-45     ----  20-24 Y    ----     < 40      >45      ----        >24 Y      ----     < 40      40-60      >60      Cholesterol/HDL Ratio 06/24/2024 2.4   Final      Ref Values  Desirable  < 3.4  High Risk  > 5.0    LDL Calculated 06/24/2024 59  <=99 mg/dL Final                                Near   Borderline      AGE      Desirable  Optimal    High     High     Very High     0-19 Y     0 - 109     ---    110-129   >/= 130     ----    20-24 Y     0 - 119     ---    120-159   >/= 160     ----      >24 Y     0 -  99   100-129  130-159   160-189     >/=190      VLDL 06/24/2024 20  0 - 40 mg/dL Final    Triglycerides 06/24/2024 99  0 - 149 mg/dL Final       Age         Desirable   Borderline High   High     Very High   0 D-90 D    19 - 174         ----         ----        ----  91 D- 9 Y     0 -  74        75 -  99     >/= 100      ----    10-19 Y     0 -  89        90 - 129     >/= 130      ----    20-24 Y     0 - 114       115 - 149     >/= 150      ----         >24 Y     0 - 149       150 - 199    200- 499    >/= 500    Venipuncture immediately after or during the administration of Metamizole may lead to falsely low results. Testing should be performed immediately prior to Metamizole dosing.    Non HDL Cholesterol 06/24/2024 78  0 - 149 mg/dL Final          Age       Desirable   Borderline High   High     Very High     0-19 Y     0 - 119       120 - 144     >/= 145    >/= 160    20-24 Y     0 - 149       150 - 189     >/= 190      ----         >24 Y    30 mg/dL above LDL Cholesterol goal      Vitamin D, 25-Hydroxy, Total 06/24/2024 45  30 - 100 ng/mL Final    Phosphorus 06/24/2024 3.8  2.5 - 4.9 mg/dL Final    The performance characteristics of phosphorus testing in heparinized plasma have been validated by the individual  laboratory site where testing is performed. Testing on heparinized plasma is not approved by the FDA; however, such approval is not necessary.          Portions of this encounter note have been copied from my previous note dated  6/22/23 , which have been updated where appropriate and all  reflect my current medical decision making from today.     A. Flutter w/ RVR April '20 -he was admitted in [facility]-converted chemically. Asymptomatic. He saw his cardiologist, Dr. Mendoza in fall '21. No recent episodes.            Now follows w/ Dr. Perez. Next appt. Mid Jul '24     hx P. A fib / palpitations - in 2014. Negative Gxt per Dr. Mendoza. No recurrence.              Now follows w/ Dr. Perez. Next appt. Mid Jul '24    Elev. chol / /history of borderline elevated blood pressure/history of tobacco use- negative cardiac stress test 8/14 per Dr. Mendoza. Goal LDL <70                Jun '23; LDL - 60  on statin           7/24-LDL 59.          mildly dilated  aortic root / ascending arch - noted on 2014 and 2016 echo exams. Reexamined in [facility] in April 2020, and again Sept 2020 - stable at 4.5 cm.    Fall '21 measurements - 4.0 cm. He will continue to follow in cardiology. He'll set up appt. in 15 months w/ Dr. Perez          Echo planned July '23, w/ follow up Nov '23             CT completed 7/1/24; stable from last CT 11/21        Prediabetes/elevated weight-weight loss discussed. Last time Suggested download a Alonso for his cell phone- Lose It to start to track all calories for the next 3 weeks and then make changes towards a 3-4 pound a month weight loss goal. Weight down a bit this yr. once again encouraged weight loss efforts through the winter              A1c June 2023 improved to 5.5%.BMI at 29. He will continue weight loss efforts.  He remains mindful of caloric intake, limiting carbs and alcohol and maintaining an active exercise routine.               7/24-A1c 5.7%.  BMI 28.96.  Weight is down 7 pounds in 12 months.  He will continue improved diet and limited alcohol.  He will continue lifestyle efforts towards weight loss      exercise -   In winter, will do stationary cycle weather permitting - outdoor cycling  4-5 times weekly- every other day or so for an hour.             "He reports that he cycles 11 miles in 1 hour when in the [facility] and 15 miles when located in [place].  Walks 40 min. On the days he doesn't cycle    Cholecystectomy-5/24 with Dr. Chicas, with a stent placement, which was removed a week later so.    Hhrgcdxngvgcov-ewlptbsjcileesxbwcj-lm attributed to Metamucil without much fluid-resolved.  He will follow-up with concerns and continue ample fluid and fiber    Umbilical hernia-he anticipates repair 11/24      Mild anemia-noted on Labs  7/24-will follow up w/ recheck of CBC along with secondary labs in several weeks.  Possibly a postop finding after his abdominal surgery       Right partial knee replacement July 2020 in [place]-right knee DJD -originally with arthroscopy completed in Dec '18 in [facility],  Now cycling again. walking occas causes knee soreness.            Presently both knees \"ok\"        acid reflux -  Hiatal hernia  w/ weight loss - off daily PPI; using OTC nexium prn.  Main triggers include chocolate peppers or [place] citizen food     Non calcified nodule - CT scan revealed nodules less than 6 cm.. Discussed risk assessment as patient is a non smoker - no f/u CT needed based on Fleischner Criteria      Liver nodule - Jan '23 US - right hepatic cyst, and a likely hemangioma     colon polyps / innumerable diverticuli / family hx colon cancer -colonoscopy report filed in Gastro testing section-reviewed with him today. colonoscopy every 3 yrs,              Updated colonoscopy in [facility] - 10/23 next in 3 yrs - Oct '26          bph - improved w/ flomax. he'll see dr. Tena annually          Appt 7/1/24. PSA normal 6/24    Remote nephrolithiasis-not problematic since last flare in his 30s.     sleeplessness-he has been on trazodone Presently doing fairly well.      glasses - he sees dr. momin 1-2 x yr here          Visits UTD, no concerns      right ear pressure and ringing x 3-4 wks. Discussed starting Allegra 180 for the next 2-3 weeks for " improvement. No concerns. Will consult audiology for a hearing aid.           Hearing aids on hold for now     Skin Care concerns - encouraged skin care, sun screen when outdoors, and follow up w/ dermatology w/ any concerning skin changes.             2022; He has a concern on his back. He will set up dermatology consultation           He'll see Dr. Miller late July '24 for a neck spot     Cyst to left middle digit - Follow up with hand surgeon suggested // declined.     dental care - semiannually-he follows with a dentist in [place].- UTD     Depression concerns-6/23-sometimes he feels a bit down in the morning.  He does not think it serious.  Discussed maintaining a routine, including exercise and including activities which purpose wellness, such as volunteering.  Encouraged him to call with concerns          7/24 - no active concerns now. More physical activity helps    travel concerns - he's in [place] for 2-3 months 4 x yr w/ wife who is [place], and their daughter and her family live there     Flu shot encouraged each fall-in September ideally     Covid series completed. Suggested booster each fall     Shingrix series completed.     Follow-up in 6  months sooner with concerns.     Charting was completed using voice recognition technology and may include unintended errors.

## 2024-07-08 ENCOUNTER — OFFICE VISIT (OUTPATIENT)
Dept: CARDIOLOGY | Facility: CLINIC | Age: 74
End: 2024-07-08
Payer: COMMERCIAL

## 2024-07-08 VITALS
BODY MASS INDEX: 29.77 KG/M2 | HEART RATE: 56 BPM | WEIGHT: 201 LBS | HEIGHT: 69 IN | OXYGEN SATURATION: 95 % | SYSTOLIC BLOOD PRESSURE: 115 MMHG | DIASTOLIC BLOOD PRESSURE: 76 MMHG

## 2024-07-08 DIAGNOSIS — I10 PRIMARY HYPERTENSION: Primary | ICD-10-CM

## 2024-07-08 DIAGNOSIS — I48.92 PAROXYSMAL ATRIAL FLUTTER (MULTI): ICD-10-CM

## 2024-07-08 DIAGNOSIS — E78.49 OTHER HYPERLIPIDEMIA: ICD-10-CM

## 2024-07-08 DIAGNOSIS — I71.21 ANEURYSM OF ASCENDING AORTA WITHOUT RUPTURE (CMS-HCC): ICD-10-CM

## 2024-07-08 PROCEDURE — 93005 ELECTROCARDIOGRAM TRACING: CPT | Performed by: INTERNAL MEDICINE

## 2024-07-08 PROCEDURE — 1036F TOBACCO NON-USER: CPT | Performed by: INTERNAL MEDICINE

## 2024-07-08 PROCEDURE — 99214 OFFICE O/P EST MOD 30 MIN: CPT | Mod: 25 | Performed by: INTERNAL MEDICINE

## 2024-07-08 PROCEDURE — 93010 ELECTROCARDIOGRAM REPORT: CPT | Performed by: INTERNAL MEDICINE

## 2024-07-08 PROCEDURE — 3074F SYST BP LT 130 MM HG: CPT | Performed by: INTERNAL MEDICINE

## 2024-07-08 PROCEDURE — 1160F RVW MEDS BY RX/DR IN RCRD: CPT | Performed by: INTERNAL MEDICINE

## 2024-07-08 PROCEDURE — 99214 OFFICE O/P EST MOD 30 MIN: CPT | Performed by: INTERNAL MEDICINE

## 2024-07-08 PROCEDURE — 1123F ACP DISCUSS/DSCN MKR DOCD: CPT | Performed by: INTERNAL MEDICINE

## 2024-07-08 PROCEDURE — 1126F AMNT PAIN NOTED NONE PRSNT: CPT | Performed by: INTERNAL MEDICINE

## 2024-07-08 PROCEDURE — 3078F DIAST BP <80 MM HG: CPT | Performed by: INTERNAL MEDICINE

## 2024-07-08 PROCEDURE — 1159F MED LIST DOCD IN RCRD: CPT | Performed by: INTERNAL MEDICINE

## 2024-07-08 ASSESSMENT — COLUMBIA-SUICIDE SEVERITY RATING SCALE - C-SSRS
1. IN THE PAST MONTH, HAVE YOU WISHED YOU WERE DEAD OR WISHED YOU COULD GO TO SLEEP AND NOT WAKE UP?: NO
6. HAVE YOU EVER DONE ANYTHING, STARTED TO DO ANYTHING, OR PREPARED TO DO ANYTHING TO END YOUR LIFE?: NO
2. HAVE YOU ACTUALLY HAD ANY THOUGHTS OF KILLING YOURSELF?: NO

## 2024-07-08 ASSESSMENT — ENCOUNTER SYMPTOMS
DEPRESSION: 0
LOSS OF SENSATION IN FEET: 0
OCCASIONAL FEELINGS OF UNSTEADINESS: 0

## 2024-07-08 ASSESSMENT — PAIN SCALES - GENERAL: PAINLEVEL: 0-NO PAIN

## 2024-07-08 NOTE — PROGRESS NOTES
"Chief Complaint:   No chief complaint on file.     History Of Present Illness:    Uvaldo Domingo is a 73 y.o. male with PMH of Atrial flutter, DLD, Pre-DM, and Ascending aortic aneurysm, who is presenting for a follow up visit, last seen in clinic in Jan 2024.     The patient overall reports doing very well. He reports getting his gallbladder out in May of this year, which was complicated by diverticulitis afterwards. He states otherwise he has been doing well, and has been walking or cycling every day. He denies any chest pain, SOB, lightheadedness, dizziness, palpitations. He reports good compliance with his medications. Denies any bleeding on his Eliquis.     Cardiac Hx:   - ECHO 07/24: EF 60-65%, aortic root dilation increase from 3.6 to 4.2, mild AI  - CT angiography of chest 07/24: Aneurysmal dilation of the ascending aorta measuring up to 4.0 cm, stable in size  -TTE 7/5/23: EF 60-65%, mid aortic aneurysm 4.2 cm   - TTE 2020 at [facility]: EF 57%, and Aortic root dilation at 4.5 cm  - CT angiography of chest 11/2021: Aneurysmal dilation of the ascending aorta measuring up to 4.0 cm      Last Recorded Vitals:  Vitals:    07/08/24 0849   BP: 115/76   BP Location: Left arm   Patient Position: Sitting   Pulse: 56   SpO2: 95%   Weight: 91.2 kg (201 lb)   Height: 1.753 m (5' 9\")       Past Medical History:  He has a past medical history of Acute diverticulitis (05/22/2024), Acute sinusitis, unspecified (11/12/2012), Cholelithiasis (06/22/2023), Derangement of unspecified medial meniscus due to old tear or injury, right knee (11/14/2018), Effusion, left knee (08/03/2018), Encounter for general adult medical examination without abnormal findings (07/08/2015), Gall bladder polyp (06/22/2023), Headache, unspecified (11/13/2018), Iliotibial band syndrome, right leg (10/01/2019), Impaired fasting glucose (05/17/2019), Osteoarthritis of knee, unspecified (01/19/2015), Other conditions influencing health status " (2015), Other specified diseases of liver (2022), Pain in right hip (10/04/2017), Pain in right knee (2018), Pain in unspecified knee (2015), Palpitations (2014), Personal history of other diseases of the circulatory system (2018), Personal history of other diseases of the digestive system (2022), Personal history of other diseases of the respiratory system (2019), Personal history of urinary calculi (2019), Rheumatic tricuspid insufficiency (2014), Strain of muscle, fascia and tendon of right hip, subsequent encounter (10/25/2017), and Thoracic aortic aneurysm, without rupture, unspecified (CMS-HCC) (2017).    Past Surgical History:  He has a past surgical history that includes Colonoscopy (2012) and Cholecystectomy (N/A).      Social History:  He reports that he has never smoked. He has never used smokeless tobacco. He reports current alcohol use of about 7.0 standard drinks of alcohol per week. No history on file for drug use.    Family History:  Family History   Problem Relation Name Age of Onset    Other (cva) Mother           at 94    Colon cancer Father           at 83    Other (mitral valve replacement) Father          MV replac twice    No Known Problems Sister      Other (knee replacemtns) Sister      No Known Problems Daughter      Lumbar disc disease Son          hx remote trauma / back injury        Allergies:  Pollen extracts    Outpatient Medications:  Current Outpatient Medications   Medication Instructions    apixaban (ELIQUIS) 5 mg, oral, 2 times daily    atenolol (TENORMIN) 25 mg, oral, Daily    azelastine (Astelin) 137 mcg (0.1 %) nasal spray Administer 2 sprays into each nostril 2 times a day.    cholecalciferol (VITAMIN D3) 50 mcg, oral, Daily    fluticasone (Flonase) 50 mcg/actuation nasal spray 2 sprays, Each Nostril, Daily    methocarbamol (ROBAXIN-750) 750 mg, oral, 4 times daily    simvastatin (ZOCOR) 20 mg,  oral, Nightly    tamsulosin (FLOMAX) 0.4 mg, oral, Daily    traZODone (DESYREL) 50 mg, oral, Nightly PRN       Physical Exam:  Constitutional: Well-developed male in no acute distress.  HEENT: Normocephalic, atraumatic. PERRL. EOMI. No cervical lymphadenopathy.  Respiratory: CTA bilaterally. No wheezes, rales, or rhonchi. Normal respiratory effort.  Cardiovascular: RRR. No murmurs, gallops, or rubs. No JVD. Radial pulses 2+.  MSK: No LE edema bilaterally.  Psych: Appropriate mood and affect.      Last Labs:  CBC -  Lab Results   Component Value Date    WBC 4.4 06/24/2024    HGB 13.4 (L) 06/24/2024    HCT 40.1 (L) 06/24/2024    MCV 91 06/24/2024     (L) 06/24/2024       CMP -  Lab Results   Component Value Date    CALCIUM 8.9 06/24/2024    PHOS 3.8 06/24/2024    PROT 6.4 06/24/2024    ALBUMIN 4.0 06/24/2024    AST 17 06/24/2024    ALT 19 06/24/2024    ALKPHOS 35 06/24/2024    BILITOT 0.5 06/24/2024       LIPID PANEL -   Lab Results   Component Value Date    CHOL 136 06/24/2024    TRIG 99 06/24/2024    HDL 57.6 06/24/2024    CHHDL 2.4 06/24/2024    LDLF 60 06/12/2023    VLDL 20 06/24/2024    NHDL 78 06/24/2024       RENAL FUNCTION PANEL -   Lab Results   Component Value Date    GLUCOSE 95 06/24/2024     06/24/2024    K 4.2 06/24/2024     06/24/2024    CO2 25 06/24/2024    ANIONGAP 14 06/24/2024    BUN 15 06/24/2024    CREATININE 0.81 06/24/2024    GFRMALE 89 06/12/2023    CALCIUM 8.9 06/24/2024    PHOS 3.8 06/24/2024    ALBUMIN 4.0 06/24/2024        Lab Results   Component Value Date    HGBA1C 5.7 (H) 06/24/2024       Assessment/Plan   Uvaldo Domingo is a 73 M with PMH of Atrial flutter, DLD, Pre-DM, and Ascending aortic aneurysm, who is presenting for a follow up visit. Ascending aortic root dilation appears stable in size from recent CT chest. Given stable appearance since 2021, will obtain repeat imaging in 2-3 years.      #Ascending aortic dilation  -TTE 7/5/23: EF 60-65%, mid aortic aneurysm  4.2 cm   - TTE 2020 at [facility]: EF 57%, and Aortic root dilation at 4.5 cm  - CT angiography of chest 11/2021: Aneurysmal dilation of the ascending aorta measuring up to 4.0 cm   - ECHO 07/24: EF 60-65%, aortic root dilation increase from 3.6 to 4.2, mild AI  - CT angiography of chest 07/24: Aneurysmal dilation of the ascending aorta measuring up to 4.0 cm, stable in size  Plan:  - BP at goal   -Given stable size since 2021, repeat imaging in 2-3 years     #Atrial fibrillation and flutter   - Atrial fibrillation with palpitations back in 2014. Negative event monitor per Dr. Mendoza. No recurrence.  - Atrial flutter with RVR April 2020 and admitted to the hospital in [facility] and converted chemically  - EKG with NSR today   - CHADS-VASC: 2   Plan:  -Eliquis 5 BID   -Atenolol 25 daily      #DLD   - Latest Lipid panel on 6/24: LDL 59, HDL 57, TG 99, total 136  - Highest LDL back in 2015 was 109  Plan:  -Continue Simvastatin 20 mg daily     #Pre-DM  -A1C 5.7 06/24     - RTC in 1 year      #Modifiable CV Risk Factor Summary  - BP: at goal  - LDL: at goal  - Diabetes: Most recent A1c 5.7 (6/24)  - Obesity: body mass index is 29.75 kg/m².      Follow-up in the office in 1 year.      Patient was seen and examined by Attending Physician, Dr. Perez, who agrees with this plan.     Tanya Patel, PGY-2  Internal Medicine    PATIENT SEEN, EXAMINED, AND DISCUSSED WITH RESIDENT. AGREE WITH NOTE ABOVE. ASCENDING AORTA STABLE SO CAN DO SURVEILLANCE CTA EVERY 2-3 YEARS. RTC 1 YEAR.

## 2024-07-10 LAB
ATRIAL RATE: 53 BPM
P AXIS: 66 DEGREES
P OFFSET: 163 MS
P ONSET: 122 MS
PR INTERVAL: 204 MS
Q ONSET: 224 MS
QRS COUNT: 9 BEATS
QRS DURATION: 80 MS
QT INTERVAL: 438 MS
QTC CALCULATION(BAZETT): 410 MS
QTC FREDERICIA: 420 MS
R AXIS: -7 DEGREES
T AXIS: 5 DEGREES
T OFFSET: 443 MS
VENTRICULAR RATE: 53 BPM

## 2024-07-29 ENCOUNTER — APPOINTMENT (OUTPATIENT)
Dept: RADIOLOGY | Facility: CLINIC | Age: 74
End: 2024-07-29
Payer: COMMERCIAL

## 2024-07-30 ENCOUNTER — APPOINTMENT (OUTPATIENT)
Dept: DERMATOLOGY | Facility: CLINIC | Age: 74
End: 2024-07-30
Payer: COMMERCIAL

## 2024-07-30 DIAGNOSIS — L82.1 SEBORRHEIC KERATOSIS: ICD-10-CM

## 2024-07-30 DIAGNOSIS — L72.0 EPIDERMAL CYST: Primary | ICD-10-CM

## 2024-07-30 PROCEDURE — 1036F TOBACCO NON-USER: CPT | Performed by: STUDENT IN AN ORGANIZED HEALTH CARE EDUCATION/TRAINING PROGRAM

## 2024-07-30 PROCEDURE — 1159F MED LIST DOCD IN RCRD: CPT | Performed by: STUDENT IN AN ORGANIZED HEALTH CARE EDUCATION/TRAINING PROGRAM

## 2024-07-30 PROCEDURE — 99203 OFFICE O/P NEW LOW 30 MIN: CPT | Performed by: STUDENT IN AN ORGANIZED HEALTH CARE EDUCATION/TRAINING PROGRAM

## 2024-07-30 PROCEDURE — 1123F ACP DISCUSS/DSCN MKR DOCD: CPT | Performed by: STUDENT IN AN ORGANIZED HEALTH CARE EDUCATION/TRAINING PROGRAM

## 2024-07-30 NOTE — PROGRESS NOTES
Subjective   Uvaldo Domingo is a 73 y.o. male who presents for the following: Suspicious Skin Lesion (Present on neck for about 4-5 months. Denies pain, itching, growth.)    Skin Cancer History  None    Family History of Skin Cancer  Mother with possible history      Objective   Well appearing patient in no apparent distress; mood and affect are within normal limits.    A focused examination was performed including head, including the scalp, face, neck, nose, ears, eyelids, and lips and chest, axillae, abdomen, back, and buttocks. All findings within normal limits unless otherwise noted below.    Neck - Anterior  Mobile subcutaneous papule    Left Hand - Posterior, Right Hand - Posterior  Stuck on verrucous, tan-brown papules and plaques.        Assessment/Plan   Epidermal cyst  Neck - Anterior    Reassured of benign nature of lesion  Offered excision if symptomatic; he declined    Seborrheic keratosis (2)  Left Hand - Posterior; Right Hand - Posterior    Reassured of benign nature of lesions    RTC PRN    Scribe Attestation  By signing my name below, IMarleni LPN , Scribe   attest that this documentation has been prepared under the direction and in the presence of Marcos Miller MD.

## 2024-07-30 NOTE — PATIENT INSTRUCTIONS
WHAT TO LOOK FOR: ABCDES OF MELANOMA:    A is for Asymmetry  One half of the spot is unlike the other half.    B is for Border  The spot has an irregular, scalloped, or poorly defined border.    C is for Color  The spot has varying colors from one area to the next, such as shades of tan, brown or black, or areas of white, red, or blue.    D is for Diameter  While melanomas are usually greater than 6 millimeters, or about the size of a pencil eraser, when diagnosed, they can be smaller.    E is for Evolving  The spot looks different from the rest or is changing in size, shape, or color.    Protecting Your Skin from the Sun     The sun’s rays contain ultraviolet (UV) radiation that can damage our skin. There is no “safe” ultraviolet ray. Even on cloudy days, UV radiation reaches the earth and can cause skin damage. Ultraviolet A (UVA) is primarily responsible for premature aging, wrinkles, and tanning, while ultraviolet B (UVB) causes sunburns. Both types can severely damage the skin and cause skin cancer.    Sun exposure is the most preventable risk factor for all skin cancers, including melanoma. You can still have fun in the sun and decrease your risk for skin cancer.     Apply water-resistant sunscreen generously with a Sun Protection Factor (SPF) of at least 30 that provides broad-spectrum protection from both ultraviolet A (UVA) and ultraviolet B (UVB) rays to all exposed skin. Re-apply every two hours, even on cloudy days, and after sweating or swimming. Sunscreens are not perfect because some ultraviolet light may still get through sunscreens, so they should not be used as a way of prolonging sun exposure.  Seek shade when appropriate, remembering that the sun’s rays are the strongest between 10 AM and 4 PM.  Wear sun protective clothing such as a long-sleeved shirt, pants, a wide-brimmed hat, and sunglasses, when possible.  Some sunlight will get through your clothing.  You can wear sunscreen underneath, or  you can buy clothing that has been treated to give additional sun protection.  Such clothing will have a UPF rating on the label.  (e.g., www.[employer], www.Gingr)  Protect children from sun exposure by having them play in the shade, dressing them in protective clothing, and applying sunscreen.  Use extra precaution when near water, snow, and sand.  These surfaces reflect the damaging rays of the sun and can increase your chance of sunburn.  Get vitamin D safely through a healthy diet that may include vitamin supplements.  Don’t seek the sun for vitamin D.  Avoid tanning beds. Ultraviolet light from the sun and tanning beds can cause wrinkling and skin cancer. If you want to look like you’ve been in the sun, consider using a sunless self-tanning product, but continue to use sunscreen with it.  Perform a regular self skin exam.  If you notice anything changing, growing, or bleeding on your skin, see a dermatologist.  Skin cancer is very treatable when caught early.    Examples of good broad-spectrum sunscreens:  Blue Lizard  Coppertone Spectra 3  [place] Block  Neutrogena Ultra Sheer Dry Touch  Vanicream  Solbar  Total Block/Cotz  Elta MD    What the active ingredients do:  UVB blockers:  padimate O homosalate, octyl methoxycinnamate, benzophenone octyl salicylate, phenylbenzimadazole sulfonic acid, octocrylene  UVA blockers:  oxybenzone, avobenzone (Parsol 1789)  Physical blockers:  titanium dioxide, zinc oxide (These are chemical-free sunscreens that reflect both UVA and UVB. These may be safest if you are allergic to some sunscreens. These may also be better for sensitive skin.)

## 2024-08-01 ENCOUNTER — TELEPHONE (OUTPATIENT)
Dept: CARDIOLOGY | Facility: HOSPITAL | Age: 74
End: 2024-08-01
Payer: COMMERCIAL

## 2024-08-01 DIAGNOSIS — G47.09 OTHER INSOMNIA: ICD-10-CM

## 2024-08-01 DIAGNOSIS — I48.92 UNSPECIFIED ATRIAL FLUTTER (MULTI): ICD-10-CM

## 2024-08-01 RX ORDER — TRAZODONE HYDROCHLORIDE 50 MG/1
50 TABLET ORAL NIGHTLY PRN
Qty: 90 TABLET | Refills: 3 | Status: SHIPPED | OUTPATIENT
Start: 2024-08-01

## 2024-08-01 NOTE — TELEPHONE ENCOUNTER
Please sign thanks. Saw Dr. Perez recently. [facility].

## 2024-08-05 ENCOUNTER — APPOINTMENT (OUTPATIENT)
Dept: CARDIOLOGY | Facility: CLINIC | Age: 74
End: 2024-08-05
Payer: COMMERCIAL

## 2024-08-06 ENCOUNTER — LAB (OUTPATIENT)
Dept: LAB | Facility: LAB | Age: 74
End: 2024-08-06
Payer: COMMERCIAL

## 2024-08-06 DIAGNOSIS — D64.9 MILD ANEMIA: ICD-10-CM

## 2024-08-06 LAB
ERYTHROCYTE [DISTWIDTH] IN BLOOD BY AUTOMATED COUNT: 12.5 % (ref 11.5–14.5)
FOLATE SERPL-MCNC: 12.8 NG/ML
HCT VFR BLD AUTO: 41.6 % (ref 41–52)
HGB BLD-MCNC: 14.3 G/DL (ref 13.5–17.5)
IRON SATN MFR SERPL: 28 % (ref 25–45)
IRON SERPL-MCNC: 94 UG/DL (ref 35–150)
MCH RBC QN AUTO: 30.6 PG (ref 26–34)
MCHC RBC AUTO-ENTMCNC: 34.4 G/DL (ref 32–36)
MCV RBC AUTO: 89 FL (ref 80–100)
NRBC BLD-RTO: 0 /100 WBCS (ref 0–0)
PLATELET # BLD AUTO: 177 X10*3/UL (ref 150–450)
RBC # BLD AUTO: 4.68 X10*6/UL (ref 4.5–5.9)
TIBC SERPL-MCNC: 338 UG/DL (ref 240–445)
UIBC SERPL-MCNC: 244 UG/DL (ref 110–370)
VIT B12 SERPL-MCNC: 401 PG/ML (ref 211–911)
WBC # BLD AUTO: 4.4 X10*3/UL (ref 4.4–11.3)

## 2024-08-06 PROCEDURE — 83550 IRON BINDING TEST: CPT

## 2024-08-06 PROCEDURE — 82746 ASSAY OF FOLIC ACID SERUM: CPT

## 2024-08-06 PROCEDURE — 36415 COLL VENOUS BLD VENIPUNCTURE: CPT

## 2024-08-06 PROCEDURE — 85027 COMPLETE CBC AUTOMATED: CPT

## 2024-08-06 PROCEDURE — 83540 ASSAY OF IRON: CPT

## 2024-08-06 PROCEDURE — 82607 VITAMIN B-12: CPT

## 2024-08-07 NOTE — RESULT ENCOUNTER NOTE
Jasmeet    Thank you for doing the labs.  I am glad that the complete blood count shows no signs of anemia.  The B12 and folate levels look normal as do the iron studies.    Wishing you and your family the best of health!    Sincerely,  Aleksander Prabhakar MD

## 2024-11-11 ENCOUNTER — PATIENT MESSAGE (OUTPATIENT)
Dept: CARDIOLOGY | Facility: CLINIC | Age: 74
End: 2024-11-11
Payer: COMMERCIAL

## 2024-11-11 DIAGNOSIS — I48.92 UNSPECIFIED ATRIAL FLUTTER (MULTI): ICD-10-CM

## 2024-11-12 ENCOUNTER — APPOINTMENT (OUTPATIENT)
Dept: SURGERY | Facility: CLINIC | Age: 74
End: 2024-11-12
Payer: COMMERCIAL

## 2024-11-14 ENCOUNTER — APPOINTMENT (OUTPATIENT)
Dept: SURGERY | Facility: CLINIC | Age: 74
End: 2024-11-14
Payer: COMMERCIAL

## 2024-11-14 VITALS
WEIGHT: 200 LBS | BODY MASS INDEX: 29.53 KG/M2 | SYSTOLIC BLOOD PRESSURE: 140 MMHG | TEMPERATURE: 96.5 F | DIASTOLIC BLOOD PRESSURE: 82 MMHG | HEART RATE: 66 BPM | OXYGEN SATURATION: 96 %

## 2024-11-14 DIAGNOSIS — K42.0 INCARCERATED UMBILICAL HERNIA: Primary | ICD-10-CM

## 2024-11-14 PROCEDURE — 1036F TOBACCO NON-USER: CPT | Performed by: SURGERY

## 2024-11-14 PROCEDURE — 1123F ACP DISCUSS/DSCN MKR DOCD: CPT | Performed by: SURGERY

## 2024-11-14 PROCEDURE — 1160F RVW MEDS BY RX/DR IN RCRD: CPT | Performed by: SURGERY

## 2024-11-14 PROCEDURE — 1159F MED LIST DOCD IN RCRD: CPT | Performed by: SURGERY

## 2024-11-14 PROCEDURE — 99213 OFFICE O/P EST LOW 20 MIN: CPT | Performed by: SURGERY

## 2024-11-14 NOTE — PROGRESS NOTES
Subjective   Patient ID: Uvaldo Domingo is a 74 y.o. male who presents for Establish patient (hernia).    HPI had a previous cholecystectomy, at that time he was diagnosed with incarcerated umbilical hernia, it was intact during the surgery, recommended repair on elective basis.  Review of Systems nonreducible tender lump in umbilical area.  Review of other 14 system is negative  Physical Exam physical bilaterally, mucosa moist, bilateral breath sounds, clear to auscultation, irregular rhythm rate, abdomen soft, nontender, well-healed incision from previous surgeries.  Palpable nonreducible 3.5 cm incisional hernia.  No other hernias.  Palpable peripheral pulse, no focal neurological motor deficits.    Objective I reviewed all available data including lab results, radiological studies, previous reports and notes.    No diagnosis found.   Patient Active Problem List   Diagnosis    Arthritis of knee, left    ASNHL (asymmetrical sensorineural hearing loss)    Benign enlargement of prostate    Deviated nasal septum    Facial pressure    Gastroesophageal reflux disease with hiatal hernia    Hearing loss    Liver nodule    Male erectile disorder of organic origin    Ascending aortic aneurysm (CMS-HCC)    Mild ascending aorta dilatation (CMS-HCC)    Mitral regurgitation    Mixed conductive and sensorineural hearing loss of both ears    Overweight with body mass index (BMI) 25.0-29.9    Paroxysmal atrial flutter (Multi)    Prediabetes    Seasonal allergies    Tinnitus of right ear    Tubular adenoma of colon    Ultrasound scan abnormal    Ventral hernia    Chronic right-sided back pain    Multiple pulmonary nodules    Annual physical exam      Allergies   Allergen Reactions    Pollen Extracts Runny nose and Unknown      Medication Documentation Review Audit       Reviewed by Kd Chicas MD (Physician) on 11/14/24 at 0954      Medication Order Taking? Sig Documenting Provider Last Dose Status     Discontinued 11/11/24  1355   apixaban (Eliquis) 5 mg tablet [id number]  Take 1 tablet (5 mg) by mouth 2 times a day. Maryjane Perez MD  Active   atenolol (Tenormin) 25 mg tablet [id number] No Take 1 tablet (25 mg) by mouth once daily. Aleksander Prabhakar MD Taking Active   azelastine (Astelin) 137 mcg (0.1 %) nasal spray [id number] No Administer 2 sprays into each nostril 2 times a day. Historical Provider, MD Taking Active   cholecalciferol (Vitamin D3) 50 MCG (2000 UT) tablet [id number] No Take 1 tablet (50 mcg) by mouth once daily. Aleksander Prabhakar MD Taking Active   fluticasone (Flonase) 50 mcg/actuation nasal spray [id number] No Administer 2 sprays into each nostril once daily. Historical Provider, MD Taking Active   methocarbamol (Robaxin-750) 750 mg tablet [id number] No Take 1 tablet (750 mg) by mouth 4 times a day. Dominick Cheema MD Taking  24 2359   simvastatin (Zocor) 20 mg tablet [id number] No Take 1 tablet (20 mg) by mouth once daily at bedtime. Aleksander Prabhakar MD Taking Active   tamsulosin (Flomax) 0.4 mg 24 hr capsule [id number] No Take 1 capsule (0.4 mg) by mouth once daily. Aleksander Prabhakar MD Taking Active   traZODone (Desyrel) 50 mg tablet [id number]  Take 1 tablet (50 mg) by mouth as needed at bedtime for sleep. Aleksander Prabhakar MD  Active                    Past Medical History:   Diagnosis Date    Acute diverticulitis 2024    Acute sinusitis, unspecified 2012    Acute sinusitis    Cholelithiasis 2023    Derangement of unspecified medial meniscus due to old tear or injury, right knee 2018    Old tear of medial meniscus of right knee, unspecified tear type    Effusion, left knee 2018    Effusion of left knee    Encounter for general adult medical examination without abnormal findings 2015    Encounter for Medicare annual wellness exam    Gall bladder polyp 2023    Headache, unspecified 2018    Frontal headache    Iliotibial band syndrome, right leg 10/01/2019    It band syndrome,  right    Impaired fasting glucose 2019    Elevated fasting glucose    Osteoarthritis of knee, unspecified 2015    Osteoarthritis of knee    Other conditions influencing health status 2015    Cardiovascular Stress Test    Other specified diseases of liver 2022    Liver nodule    Pain in right hip 10/04/2017    Right hip pain    Pain in right knee 2018    Right knee pain    Pain in unspecified knee 2015    Knee pain    Palpitations 2014    Palpitations    Personal history of other diseases of the circulatory system 2018    History of orthostatic hypotension    Personal history of other diseases of the digestive system 2022    History of cholelithiasis    Personal history of other diseases of the respiratory system 2019    History of sinusitis    Personal history of urinary calculi 2019    History of renal calculi    Rheumatic tricuspid insufficiency 2014    Tricuspid regurgitation    Strain of muscle, fascia and tendon of right hip, subsequent encounter 10/25/2017    Strain of right hip, subsequent encounter    Thoracic aortic aneurysm, without rupture, unspecified (CMS-HCC) 2017    Thoracic aortic aneurysm without rupture     Social History     Tobacco Use   Smoking Status Never   Smokeless Tobacco Never     Family History   Problem Relation Name Age of Onset    Other (cva) Mother           at 94    Colon cancer Father           at 83    Other (mitral valve replacement) Father          MV replac twice    No Known Problems Sister      Other (knee replacemtns) Sister      No Known Problems Daughter      Lumbar disc disease Son          hx remote trauma / back injury      Past Surgical History:   Procedure Laterality Date    CHOLECYSTECTOMY N/A      per Dr Chicas    COLONOSCOPY  2012    Complete Colonoscopy       Assessment/Plan   The patient with nonreducible 3.5 cm umbilical hernia.  The patient is indication for  laparoscopic, possible open nonreducible umbilical hernia repair.  Risks, benefits, alternative treatment were explained to the patient.  All questions were answered.  Informed consent was obtained.      Kd Chicas MD

## 2024-12-05 ENCOUNTER — APPOINTMENT (OUTPATIENT)
Dept: DERMATOLOGY | Facility: CLINIC | Age: 74
End: 2024-12-05
Payer: COMMERCIAL

## 2025-01-02 LAB
NON-UH HIE BUN/CREAT RATIO: 15.6
NON-UH HIE BUN: 14 MG/DL (ref 9–23)
NON-UH HIE CALCIUM: 9.5 MG/DL (ref 8.7–10.4)
NON-UH HIE CALCULATED OSMOLALITY: 282 MOSM/KG (ref 275–295)
NON-UH HIE CHLORIDE: 107 MMOL/L (ref 98–107)
NON-UH HIE CO2, VENOUS: 27 MMOL/L (ref 20–31)
NON-UH HIE CREATININE: 0.9 MG/DL (ref 0.6–1.1)
NON-UH HIE GFR AA: >60
NON-UH HIE GLOMERULAR FILTRATION RATE: >60 ML/MIN/1.73M?
NON-UH HIE GLUCOSE: 98 MG/DL (ref 74–106)
NON-UH HIE HCT: 40.5 % (ref 41–52)
NON-UH HIE HGB: 14.1 G/DL (ref 13.5–17.5)
NON-UH HIE INSTR WBC ND: 5.1
NON-UH HIE K: 4.4 MMOL/L (ref 3.5–5.1)
NON-UH HIE MCH: 31.1 PG (ref 27–34)
NON-UH HIE MCHC: 34.9 G/DL (ref 32–37)
NON-UH HIE MCV: 89.2 FL (ref 80–100)
NON-UH HIE MPV: 7.6 FL (ref 7.4–10.4)
NON-UH HIE NA: 141 MMOL/L (ref 135–145)
NON-UH HIE PLATELET: 172 X10 (ref 150–450)
NON-UH HIE RBC: 4.55 X10 (ref 4.7–6.1)
NON-UH HIE RDW: 12.7 % (ref 11.5–14.5)
NON-UH HIE WBC: 5.1 X10 (ref 4.5–11)

## 2025-01-16 ENCOUNTER — APPOINTMENT (OUTPATIENT)
Dept: PRIMARY CARE | Facility: CLINIC | Age: 75
End: 2025-01-16
Payer: COMMERCIAL

## 2025-01-19 DIAGNOSIS — E78.5 DYSLIPIDEMIA, GOAL LDL BELOW 70: ICD-10-CM

## 2025-01-19 DIAGNOSIS — R35.1 BENIGN PROSTATIC HYPERPLASIA WITH NOCTURIA: ICD-10-CM

## 2025-01-19 DIAGNOSIS — I48.92 PAROXYSMAL ATRIAL FLUTTER (MULTI): ICD-10-CM

## 2025-01-19 DIAGNOSIS — N40.1 BENIGN PROSTATIC HYPERPLASIA WITH NOCTURIA: ICD-10-CM

## 2025-01-20 RX ORDER — SIMVASTATIN 20 MG/1
20 TABLET, FILM COATED ORAL NIGHTLY
Qty: 90 TABLET | Refills: 3 | Status: SHIPPED | OUTPATIENT
Start: 2025-01-20

## 2025-01-20 RX ORDER — ATENOLOL 25 MG/1
25 TABLET ORAL DAILY
Qty: 90 TABLET | Refills: 3 | Status: SHIPPED | OUTPATIENT
Start: 2025-01-20

## 2025-01-20 RX ORDER — TAMSULOSIN HYDROCHLORIDE 0.4 MG/1
0.4 CAPSULE ORAL DAILY
Qty: 90 CAPSULE | Refills: 3 | Status: SHIPPED | OUTPATIENT
Start: 2025-01-20

## 2025-01-27 ENCOUNTER — APPOINTMENT (OUTPATIENT)
Dept: SURGERY | Facility: CLINIC | Age: 75
End: 2025-01-27
Payer: COMMERCIAL

## 2025-01-27 DIAGNOSIS — K42.0 INCARCERATED UMBILICAL HERNIA: Primary | ICD-10-CM

## 2025-01-27 PROCEDURE — 1159F MED LIST DOCD IN RCRD: CPT | Performed by: SURGERY

## 2025-01-27 PROCEDURE — 1160F RVW MEDS BY RX/DR IN RCRD: CPT | Performed by: SURGERY

## 2025-01-27 PROCEDURE — 1123F ACP DISCUSS/DSCN MKR DOCD: CPT | Performed by: SURGERY

## 2025-01-27 PROCEDURE — 1036F TOBACCO NON-USER: CPT | Performed by: SURGERY

## 2025-01-27 PROCEDURE — 99213 OFFICE O/P EST LOW 20 MIN: CPT | Performed by: SURGERY

## 2025-01-27 NOTE — PROGRESS NOTES
Subjective   Patient ID: Uvaldo Domingo is a 74 y.o. male who presents for Post-op (hernia).  Patient has no complaints  HPI history of incarcerated umbilical hernia repair, laparoscopic  Review of Systems a full from system is negative  Physical Exam pupils equal bilaterally, mucosa moist, bilateral breath sounds, regular rhythm and rate, abdomen soft, nontender, no evidence of hernia recurrence, all incisions healed by the primary intention.  No focal neurological motor deficits.  Musculoskeletal exam within normal limits, ENT exam within normal limits    Objective I reviewed all available data including lab results, radiological studies, previous reports and notes.    No diagnosis found.   Patient Active Problem List   Diagnosis    Arthritis of knee, left    ASNHL (asymmetrical sensorineural hearing loss)    Benign enlargement of prostate    Deviated nasal septum    Facial pressure    Gastroesophageal reflux disease with hiatal hernia    Hearing loss    Liver nodule    Male erectile disorder of organic origin    Ascending aortic aneurysm (CMS-HCC)    Mild ascending aorta dilatation (CMS-HCC)    Mitral regurgitation    Mixed conductive and sensorineural hearing loss of both ears    Overweight with body mass index (BMI) 25.0-29.9    Paroxysmal atrial flutter (Multi)    Prediabetes    Seasonal allergies    Tinnitus of right ear    Tubular adenoma of colon    Ultrasound scan abnormal    Ventral hernia    Chronic right-sided back pain    Multiple pulmonary nodules    Annual physical exam      Allergies   Allergen Reactions    Pollen Extracts Runny nose and Unknown      Medication Documentation Review Audit       Reviewed by Kd Chicas MD (Physician) on 01/27/25 at 0829      Medication Order Taking? Sig Documenting Provider Last Dose Status   apixaban (Eliquis) 5 mg tablet [id number]  Take 1 tablet (5 mg) by mouth 2 times a day. Maryjane Perez MD  Active   atenolol (Tenormin) 25 mg tablet [id number]  TAKE 1  TABLET BY MOUTH EVERY DAY Aleksander Prabhakar MD  Active   azelastine (Astelin) 137 mcg (0.1 %) nasal spray [id number] No Administer 2 sprays into each nostril 2 times a day. Historical Provider, MD Taking Active   cholecalciferol (Vitamin D3) 50 MCG ( UT) tablet [id number] No Take 1 tablet (50 mcg) by mouth once daily. Aleksander Prabhakar MD Taking Active   fluticasone (Flonase) 50 mcg/actuation nasal spray [id number] No Administer 2 sprays into each nostril once daily. Historical Provider, MD Taking Active   methocarbamol (Robaxin-750) 750 mg tablet [id number] No Take 1 tablet (750 mg) by mouth 4 times a day. Dominick Cheema MD Taking  24 2359   simvastatin (Zocor) 20 mg tablet [id number]  TAKE 1 TABLET BY MOUTH EVERY DAY AT BEDTIME Aleksander Prabhakar MD  Active   tamsulosin (Flomax) 0.4 mg 24 hr capsule [id number]  TAKE 1 CAPSULE BY MOUTH EVERY DAY Aleksander Prabhakar MD  Active   traZODone (Desyrel) 50 mg tablet [id number]  Take 1 tablet (50 mg) by mouth as needed at bedtime for sleep. Aleksander Prabhakar MD  Active                    Past Medical History:   Diagnosis Date    Acute diverticulitis 2024    Acute sinusitis, unspecified 2012    Acute sinusitis    Cholelithiasis 2023    Derangement of unspecified medial meniscus due to old tear or injury, right knee 2018    Old tear of medial meniscus of right knee, unspecified tear type    Effusion, left knee 2018    Effusion of left knee    Encounter for general adult medical examination without abnormal findings 2015    Encounter for Medicare annual wellness exam    Gall bladder polyp 2023    Headache, unspecified 2018    Frontal headache    Iliotibial band syndrome, right leg 10/01/2019    It band syndrome, right    Impaired fasting glucose 2019    Elevated fasting glucose    Osteoarthritis of knee, unspecified 2015    Osteoarthritis of knee    Other conditions influencing health status 2015    Cardiovascular Stress Test     Other specified diseases of liver 2022    Liver nodule    Pain in right hip 10/04/2017    Right hip pain    Pain in right knee 2018    Right knee pain    Pain in unspecified knee 2015    Knee pain    Palpitations 2014    Palpitations    Personal history of other diseases of the circulatory system 2018    History of orthostatic hypotension    Personal history of other diseases of the digestive system 2022    History of cholelithiasis    Personal history of other diseases of the respiratory system 2019    History of sinusitis    Personal history of urinary calculi 2019    History of renal calculi    Rheumatic tricuspid insufficiency 2014    Tricuspid regurgitation    Strain of muscle, fascia and tendon of right hip, subsequent encounter 10/25/2017    Strain of right hip, subsequent encounter    Thoracic aortic aneurysm, without rupture, unspecified (CMS-HCC) 2017    Thoracic aortic aneurysm without rupture     Social History     Tobacco Use   Smoking Status Never   Smokeless Tobacco Never     Family History   Problem Relation Name Age of Onset    Other (cva) Mother           at 94    Colon cancer Father           at 83    Other (mitral valve replacement) Father          MV replac twice    No Known Problems Sister      Other (knee replacemtns) Sister      No Known Problems Daughter      Lumbar disc disease Son          hx remote trauma / back injury      Past Surgical History:   Procedure Laterality Date    CHOLECYSTECTOMY N/A      per Dr Chicas    COLONOSCOPY  2012    Complete Colonoscopy       Assessment/Plan     This post laparoscopic umbilical hernia repair.  No evidence of surgical complications.  Recommend increase physical activity, follow-up as needed    Kd Chicas MD

## 2025-02-03 DIAGNOSIS — I48.92 PAROXYSMAL ATRIAL FLUTTER (MULTI): ICD-10-CM

## 2025-02-03 DIAGNOSIS — R35.1 BENIGN PROSTATIC HYPERPLASIA WITH NOCTURIA: ICD-10-CM

## 2025-02-03 DIAGNOSIS — N40.1 BENIGN PROSTATIC HYPERPLASIA WITH NOCTURIA: ICD-10-CM

## 2025-02-03 DIAGNOSIS — E78.5 DYSLIPIDEMIA, GOAL LDL BELOW 70: ICD-10-CM

## 2025-02-03 RX ORDER — ATENOLOL 25 MG/1
25 TABLET ORAL DAILY
Qty: 90 TABLET | Refills: 3 | Status: SHIPPED | OUTPATIENT
Start: 2025-02-03

## 2025-02-03 RX ORDER — SIMVASTATIN 20 MG/1
20 TABLET, FILM COATED ORAL NIGHTLY
Qty: 90 TABLET | Refills: 3 | Status: SHIPPED | OUTPATIENT
Start: 2025-02-03

## 2025-02-03 RX ORDER — TAMSULOSIN HYDROCHLORIDE 0.4 MG/1
0.4 CAPSULE ORAL DAILY
Qty: 90 CAPSULE | Refills: 3 | Status: SHIPPED | OUTPATIENT
Start: 2025-02-03

## 2025-02-06 ENCOUNTER — APPOINTMENT (OUTPATIENT)
Dept: ORTHOPEDIC SURGERY | Facility: CLINIC | Age: 75
End: 2025-02-06
Payer: COMMERCIAL

## 2025-02-06 ENCOUNTER — HOSPITAL ENCOUNTER (OUTPATIENT)
Dept: RADIOLOGY | Facility: EXTERNAL LOCATION | Age: 75
Discharge: HOME | End: 2025-02-06

## 2025-02-06 ENCOUNTER — HOSPITAL ENCOUNTER (OUTPATIENT)
Dept: RADIOLOGY | Facility: CLINIC | Age: 75
Discharge: HOME | End: 2025-02-06
Payer: COMMERCIAL

## 2025-02-06 DIAGNOSIS — M25.511 RIGHT SHOULDER PAIN, UNSPECIFIED CHRONICITY: ICD-10-CM

## 2025-02-06 DIAGNOSIS — M19.011 LOCALIZED PRIMARY OSTEOARTHRITIS OF RIGHT SHOULDER REGION: Primary | ICD-10-CM

## 2025-02-06 PROCEDURE — 20611 DRAIN/INJ JOINT/BURSA W/US: CPT | Performed by: STUDENT IN AN ORGANIZED HEALTH CARE EDUCATION/TRAINING PROGRAM

## 2025-02-06 PROCEDURE — 73030 X-RAY EXAM OF SHOULDER: CPT | Mod: RT

## 2025-02-06 PROCEDURE — 99214 OFFICE O/P EST MOD 30 MIN: CPT | Performed by: PHYSICAL MEDICINE & REHABILITATION

## 2025-02-06 RX ORDER — LIDOCAINE HYDROCHLORIDE 10 MG/ML
3.5 INJECTION, SOLUTION INFILTRATION; PERINEURAL
Status: COMPLETED | OUTPATIENT
Start: 2025-02-06 | End: 2025-02-06

## 2025-02-06 RX ORDER — TRIAMCINOLONE ACETONIDE 40 MG/ML
1.5 INJECTION, SUSPENSION INTRA-ARTICULAR; INTRAMUSCULAR
Status: COMPLETED | OUTPATIENT
Start: 2025-02-06 | End: 2025-02-06

## 2025-02-06 RX ADMIN — LIDOCAINE HYDROCHLORIDE 3.5 ML: 10 INJECTION, SOLUTION INFILTRATION; PERINEURAL at 12:15

## 2025-02-06 RX ADMIN — TRIAMCINOLONE ACETONIDE 1.5 MG: 40 INJECTION, SUSPENSION INTRA-ARTICULAR; INTRAMUSCULAR at 12:15

## 2025-02-06 SDOH — SOCIAL STABILITY: SOCIAL NETWORK: SOCIAL ACTIVITY:: 7

## 2025-02-06 NOTE — PROGRESS NOTES
PM&R  / Ortho clinic eval:    IMPRESSION:  Resolved- Chronic low back pain almost certainly due to lumbar spondylosis, right lower lumbar  New onset chronic right shoulder pain likely due to primary osteoarthritis of R shoulder.    RECOMMENDATIONS:  -Xrays reviewed of R shoulder, notable arthritic changes as compared to 2013 especially spuring on humerus.    - Physical therapy for Right Shoulder  - R shoulder injection  with ultrasound; R subacromial & R glenohumeral, performed in office today - anterior glenohumeral.  - Follow up after PT    Diagnoses and plan discussed with the patient, patient educated on above diagnoses and treatments, including alternatives     Germania Morocho MD  Fellow [facility], PM&R      Supervisory note:  Seen with Dr JANE Morocho  fellow.  I saw and evaluated the patient. I personally obtained the key and critical portions of the history and physical exam. I reviewed the fellow's documentation and discussed the patient with the fellow. I agree with the fellow's medical decision making as documented in the note.     New prob right shoulder pain since ~Aug 24, can still fly fish.  Xrays today personally interpreted shows mild joint space narrowing glenohumeral spurring, but on exam has some impingement also so we did GH and subacromial injection today.  PT referral include generalized strength training (he wants to be more active). F/u PRN but we discussed possible PRP injection as next step (would need to see sports med /[facility]?) for that  Dominick Cheema M.D, FAAPMR, R-MSK  Chief, Division of PM&R  Board Certified in PM&R, Sports Medicine and Musculoskeletal Ultrasound  Carbon copy : Vanna LAZCANO  ____________________________________________________________________    2/6/2025    CC: Patient complains of RUE shoulder pain for several months    HPI:  Patient is a very pleasant 74 year old male presenting with right shoulder pain.     Update 2/6/25:  Patient reports that since at least  "August 2024 he has had ongoing difficult with ROM of his Right shoulder. He has difficulty putting a coat on and putting his seatbelt on. Patient describes a throbbing pain, that wakes him from sleep at night. He is unable to lean on the armrest without pain.     Patient denies any pain with overhead activity. Patient denies dropping things or weakness. Patient denies any associated neck pain.    Patient does have a distant history of rotator cuff tear in R shoulder in approx 2013 after skiing. He did not have that repaired and the pain went away on its own without therapy.     Patient is sleeping on his side, and has increased comfort when the bed is flat. Patient takes tylenol periodically.     Patient describes pain in his R elbow after falling on it in September 2024. Patient describes an \"electrical feeling.\" Patient denies any numbness and tingling in the hand.     Patient is right handed, He does do fly fishing periodically.    Patient states his pain on average is 2-3/10. With movement it reaches a 8-9/10.      Recall,    Retired [employer]  at [employer].  Enjoys bicycling H/o LBP since late 60s gradually worse -    Seen at the request of pcp Dr Prabhakar.    The pain is axial only without radiation, achy deep bilateral  increases with bending and reaching forward and is relieved by rest especially sitting, lumbar support.  Ibuprofen only for very severe  episodies    Patient reports no fevers, chills, sweats, night pain, weight loss or cancer history no bowel . Bladder issues.      Pertinent Physical Exam:  MSK:   Neuro: Normal Sensation, strength, bulk and tone of upper limbs bilaterally, reflexes normal.  Looney's negative.  Right Shoulder exam Full ROM in flexion and abduction , no effusion,  tender to palpation at right elbow with minimal tenderness to palpation of shoulder musculature; impingement signs pos, negative scarf test,   Pain with internal rotation, external rotation. Able to reach to " "low back on internal rotation.   No tenderness to palpation of AC joint, or biceps tendon  Scapular kinetics WNL  Impingement positive empty can on R side  Pain with bicep flexed and arm abducted in comparison to no pain with straight arm abduction.      SUPPORTING DOCUMENTATION (remaining history, exam, other findings):  Work-up reviewed - XR R shoulder with arthritic changes    Treatment has included ibuprofen, rest.    See above for Assessment and Plan     Patient ID: Uvaldo Domingo is a 74 y.o. male.    L Inj/Asp: R subacromial bursa & glenohumeral (R subacromial bursa & R glenohumeral) on 2/6/2025 12:15 PM  Indications: pain  Details: 25 G needle, ultrasound-guided posterior approach  Medications: 3.5 mL lidocaine 10 mg/mL (1 %); 1.5 mg triamcinolone acetonide 40 mg/mL  Outcome: tolerated well, no immediate complications    1.5 kenalog and 3.5 lidocaine, 5cc total    \"Ultrasound interpretation was performed prior to the procedure to identify the target and any adjacent neurovascular structures.  Subsequent interpretation was performed during real-time needle guidance and post-intervention confirming appropriate injectate flow and hemostasis.  Images were permanently stored in our PACS system, EMR or local secure device as needed.\"      Procedure, treatment alternatives, risks and benefits explained, specific risks discussed. Consent was given by the patient. Immediately prior to procedure a time out was called to verify the correct patient, procedure, equipment, support staff and site/side marked as required. Patient was prepped and draped in the usual sterile fashion.         "

## 2025-02-13 ENCOUNTER — APPOINTMENT (OUTPATIENT)
Dept: PRIMARY CARE | Facility: CLINIC | Age: 75
End: 2025-02-13
Payer: COMMERCIAL

## 2025-02-13 VITALS
WEIGHT: 204.2 LBS | DIASTOLIC BLOOD PRESSURE: 78 MMHG | SYSTOLIC BLOOD PRESSURE: 120 MMHG | HEART RATE: 61 BPM | HEIGHT: 69 IN | BODY MASS INDEX: 30.24 KG/M2 | OXYGEN SATURATION: 96 %

## 2025-02-13 DIAGNOSIS — I10 ESSENTIAL HYPERTENSION WITH GOAL BLOOD PRESSURE LESS THAN 130/85: Primary | ICD-10-CM

## 2025-02-13 DIAGNOSIS — R35.1 BENIGN PROSTATIC HYPERPLASIA WITH NOCTURIA: ICD-10-CM

## 2025-02-13 DIAGNOSIS — Z98.890 H/O UMBILICAL HERNIA REPAIR: ICD-10-CM

## 2025-02-13 DIAGNOSIS — R73.03 PREDIABETES: ICD-10-CM

## 2025-02-13 DIAGNOSIS — E78.5 DYSLIPIDEMIA, GOAL LDL BELOW 70: ICD-10-CM

## 2025-02-13 DIAGNOSIS — I48.92 PAROXYSMAL ATRIAL FLUTTER (MULTI): ICD-10-CM

## 2025-02-13 DIAGNOSIS — I71.21 ANEURYSM OF ASCENDING AORTA WITHOUT RUPTURE (CMS-HCC): ICD-10-CM

## 2025-02-13 DIAGNOSIS — J30.2 SEASONAL ALLERGIES: ICD-10-CM

## 2025-02-13 DIAGNOSIS — I77.810 MILD ASCENDING AORTA DILATATION (CMS-HCC): ICD-10-CM

## 2025-02-13 DIAGNOSIS — D64.9 MILD ANEMIA: ICD-10-CM

## 2025-02-13 DIAGNOSIS — K44.9 GASTROESOPHAGEAL REFLUX DISEASE WITH HIATAL HERNIA: ICD-10-CM

## 2025-02-13 DIAGNOSIS — K21.9 GASTROESOPHAGEAL REFLUX DISEASE WITH HIATAL HERNIA: ICD-10-CM

## 2025-02-13 DIAGNOSIS — N40.1 BENIGN PROSTATIC HYPERPLASIA WITH NOCTURIA: ICD-10-CM

## 2025-02-13 DIAGNOSIS — Z87.19 H/O UMBILICAL HERNIA REPAIR: ICD-10-CM

## 2025-02-13 DIAGNOSIS — D12.6 TUBULAR ADENOMA OF COLON: ICD-10-CM

## 2025-02-13 PROCEDURE — 99214 OFFICE O/P EST MOD 30 MIN: CPT | Performed by: INTERNAL MEDICINE

## 2025-02-13 PROCEDURE — 1159F MED LIST DOCD IN RCRD: CPT | Performed by: INTERNAL MEDICINE

## 2025-02-13 PROCEDURE — 3074F SYST BP LT 130 MM HG: CPT | Performed by: INTERNAL MEDICINE

## 2025-02-13 PROCEDURE — 3078F DIAST BP <80 MM HG: CPT | Performed by: INTERNAL MEDICINE

## 2025-02-13 PROCEDURE — 1123F ACP DISCUSS/DSCN MKR DOCD: CPT | Performed by: INTERNAL MEDICINE

## 2025-02-13 PROCEDURE — G2211 COMPLEX E/M VISIT ADD ON: HCPCS | Performed by: INTERNAL MEDICINE

## 2025-02-13 PROCEDURE — 1160F RVW MEDS BY RX/DR IN RCRD: CPT | Performed by: INTERNAL MEDICINE

## 2025-02-13 RX ORDER — LANOLIN ALCOHOL/MO/W.PET/CERES
1000 CREAM (GRAM) TOPICAL DAILY
COMMUNITY
Start: 2025-01-02

## 2025-02-13 ASSESSMENT — PATIENT HEALTH QUESTIONNAIRE - PHQ9
1. LITTLE INTEREST OR PLEASURE IN DOING THINGS: NOT AT ALL
2. FEELING DOWN, DEPRESSED OR HOPELESS: NOT AT ALL
SUM OF ALL RESPONSES TO PHQ9 QUESTIONS 1 AND 2: 0

## 2025-02-13 NOTE — PROGRESS NOTES
"Subjective   Patient ID: Uvaldo Domingo is a 74 y.o. male who presents for Follow-up.    Here for follow-up  He has been well for the most part-he did have a fall-with right shoulder injury.  Still a bit stiff    Recently had umbilical hernia repair about a month ago             Review of Systems    Objective   /78 (BP Location: Left arm, Patient Position: Sitting, BP Cuff Size: Large adult)   Pulse 61   Ht 1.753 m (5' 9\")   Wt 92.6 kg (204 lb 3.2 oz)   SpO2 96%   BMI 30.16 kg/m²     Physical Exam  Vitals reviewed.   Constitutional:       Appearance: Normal appearance.   HENT:      Head: Normocephalic and atraumatic.   Eyes:      General: No scleral icterus.        Right eye: No discharge.         Left eye: No discharge.      Extraocular Movements: Extraocular movements intact.      Conjunctiva/sclera: Conjunctivae normal.      Pupils: Pupils are equal, round, and reactive to light.   Cardiovascular:      Rate and Rhythm: Normal rate and regular rhythm.      Pulses: Normal pulses.      Heart sounds: Murmur heard.   Pulmonary:      Effort: Pulmonary effort is normal.      Breath sounds: Normal breath sounds. No wheezing or rhonchi.   Musculoskeletal:         General: No deformity or signs of injury. Normal range of motion.      Cervical back: Normal range of motion and neck supple. No rigidity or tenderness.      Comments: Right shoulder with near normal range of motion   Lymphadenopathy:      Cervical: No cervical adenopathy.   Skin:     General: Skin is warm and dry.      Findings: No rash.   Neurological:      General: No focal deficit present.      Mental Status: He is alert and oriented to person, place, and time. Mental status is at baseline.      Cranial Nerves: No cranial nerve deficit.      Sensory: No sensory deficit.      Gait: Gait normal.   Psychiatric:         Mood and Affect: Mood normal.         Behavior: Behavior normal.         Thought Content: Thought content normal.         Judgment: " Judgment normal.         Assessment/Plan   Problem List Items Addressed This Visit             ICD-10-CM    Benign enlargement of prostate N40.0    Relevant Orders    Prostate Specific Antigen    Essential hypertension with goal blood pressure less than 130/85 - Primary I10    Gastroesophageal reflux disease with hiatal hernia K21.9, K44.9    Dyslipidemia, goal LDL below 70 E78.5    Relevant Orders    Alanine Aminotransferase    Lipid Panel    Ascending aortic aneurysm (CMS-HCC) I71.21    Mild ascending aorta dilatation (CMS-HCC) I77.810    Paroxysmal atrial flutter (Multi) I48.92    Prediabetes R73.03    Relevant Orders    Basic Metabolic Panel    Hemoglobin A1C    Seasonal allergies J30.2    Tubular adenoma of colon D12.6    H/O umbilical hernia repair Z98.890, Z87.19    Mild anemia D64.9    Relevant Orders    CBC        Portions of this encounter note have been copied from my previous note dated  7/5/24 , which have been updated where appropriate and all reflect my current medical decision making from today.     Living situation-he has been retired from the [employer].  He lives with his wife who is from [facility] [facility].  They lives in [place] for part of the year and in [place] where their daughter and her children live live part of the year.  He has a son here in [facility].          He remains independent active, each morning doing crossword puzzles and other word games    Shoulder x-ray reviewed from 2/7/2025    A. Shabbirer w/ RVR April '20 -he was admitted in [facility]-converted chemically. Asymptomatic. He saw his cardiologist, Dr. Mendoza in fall '21. No recent episodes.            Now follows w/ Dr. Perez. Next appt. Mid Jul '25     hx P. A fib / palpitations - in 2014. Negative Gxt per Dr. Mendoza. No recurrence.              Now follows w/ Dr. Perez. Next appt. Mid Jul '25    Elev. chol / /history of borderline elevated blood pressure/history of tobacco use- negative cardiac stress test 8/14 per  Dr. Mendoza. Goal LDL <70                Jun '23; LDL - 60  on statin           7/24-LDL 59.            2/25-blood pressure acceptable.  Annual fasting labs ordered        mildly dilated  aortic root / ascending arch - noted on 2014 and 2016 echo exams. Reexamined in [facility] in April 2020, and again Sept 2020 - stable at 4.5 cm.    Fall '21 measurements - 4.0 cm. He will continue to follow in cardiology. He'll set up appt. in 15 months w/ Dr. Perez          Echo planned July '23, w/ follow up Nov '23             CT completed 7/1/24; stable from last CT 11/21        Prediabetes/elevated weight-weight loss discussed. Last time Suggested download a Alonso for his cell phone- Lose It to start to track all calories for the next 3 weeks and then make changes towards a 3-4 pound a month weight loss goal. Weight down a bit this yr. once again encouraged weight loss efforts through the winter              A1c June 2023 improved to 5.5%.BMI at 29. He will continue weight loss efforts.  He remains mindful of caloric intake, limiting carbs and alcohol and maintaining an active exercise routine.               7/24-A1c 5.7%.  BMI 28.96.  Weight is down 7 pounds in 12 months.  He will continue improved diet and limited alcohol.  He will continue lifestyle efforts towards weight loss             2/25-Labs ordered-BMI 30.2 discussed long-term weight loss efforts towards a weight goal closer to 185 or even 180      exercise -   In winter, will do stationary cycle weather permitting - outdoor cycling  4-5 times weekly- every other day or so for an hour.            He reports that he cycles 11 miles in 1 hour when in the [facility] and 15 miles when located in [facility].  Walks 40 min. On the days he doesn't cycle          Winter plan - walking 35 min outdoors daily    Cholecystectomy-5/24 with Dr. Chicas, with a stent placement, which was removed a week later so.    Nrluqhsrjpzpex-nznjrwerfszkabujckq-ft attributed to Metamucil  "without much fluid-resolved.  He will follow-up with concerns and continue ample fluid and fiber    Umbilical hernia- s/p surgery 1/10/25 w/ Dr Chicas          Doing well now abut 4 wks post op      Mild anemia-noted on Labs  7/24-will follow up w/ recheck of CBC along with secondary labs in several weeks.  Possibly a postop finding after his abdominal surgery            2/25-Labs ordered       Right partial knee replacement July 2020 in [facility]-right knee DJD -originally with arthroscopy completed in Dec '18 in [facility],  Now cycling again. walking occas causes knee soreness.            Presently both knees \"ok\"     Right shoulder pain - s/p fall cross country skiing several yrs ago.          2/25 - right shoulder more sore - moderate DJD on xray. Dr. Cheema did an injection, improved. PT to start 2/14/25; encouraged heating pad 20-30 min, then stretch         acid reflux -  Hiatal hernia  w/ weight loss - off daily PPI; using OTC nexium prn.  Main triggers include chocolate peppers or Peruvian food     Non calcified nodule - CT scan revealed nodules less than 6 cm.. Discussed risk assessment as patient is a non smoker - no f/u CT needed based on Fleischner Criteria      Liver nodule - Jan '23 US - right hepatic cyst, and a likely hemangioma     colon polyps / innumerable diverticuli / family hx colon cancer -colonoscopy report filed in Gastro testing section-reviewed with him today. colonoscopy every 3 yrs,              Updated colonoscopy in [facility] - 10/23            next in 3 yrs - Oct '26          bph - improved w/ flomax. he'll see dr. Tena annually          Appt 7/1/24. PSA normal 6/24           Next appt later in Aug '25; he'll do a PSA before    Remote nephrolithiasis-not problematic since last flare in his 30s.     sleeplessness-he has been on trazodone Presently doing fairly well.      glasses - he sees dr. momin 1-2 x yr here          Visits UTD, no concerns      right ear pressure " and ringing x 3-4 wks. Discussed starting Allegra 180 for the next 2-3 weeks for improvement. No concerns. Will consult audiology for a hearing aid.           Hearing aids on hold for now     Skin Care concerns - encouraged skin care, sun screen when outdoors, and follow up w/ dermatology w/ any concerning skin changes.             2022; He has a concern on his back. He will set up dermatology consultation           He'll see Dr. Miller late July '24 for a neck spot     Cyst to left middle digit - Follow up with hand surgeon suggested // declined.     dental care - semiannually-he follows with a dentist in [place].- UTD     Depression concerns-6/23-sometimes he feels a bit down in the morning.  He does not think it serious.  Discussed maintaining a routine, including exercise and including activities which purpose wellness, such as volunteering.  Encouraged him to call with concerns          7/24 - no active concerns now. More physical activity helps    travel concerns - he's in [place] for 2-3 months 4 x yr w/ wife who is [place], and their daughter and her family live there.  Their son lives in [facility]     Flu shot encouraged each fall-in September ideally     Covid series completed. Suggested booster each fall     Shingrix series completed.     Follow-up in 6  months sooner with concerns.     Charting was completed using voice recognition technology and may include unintended errors.

## 2025-02-14 ENCOUNTER — EVALUATION (OUTPATIENT)
Dept: PHYSICAL THERAPY | Facility: CLINIC | Age: 75
End: 2025-02-14
Payer: COMMERCIAL

## 2025-02-14 DIAGNOSIS — M19.011 LOCALIZED PRIMARY OSTEOARTHRITIS OF RIGHT SHOULDER REGION: ICD-10-CM

## 2025-02-14 DIAGNOSIS — M25.511 RIGHT SHOULDER PAIN, UNSPECIFIED CHRONICITY: ICD-10-CM

## 2025-02-14 PROCEDURE — 97110 THERAPEUTIC EXERCISES: CPT | Mod: GP | Performed by: PHYSICAL THERAPIST

## 2025-02-14 PROCEDURE — 97161 PT EVAL LOW COMPLEX 20 MIN: CPT | Mod: GP | Performed by: PHYSICAL THERAPIST

## 2025-02-14 NOTE — LETTER
February 17, 2025    Kendall Cannon, PT  [address]  [place] [address]    Patient: Uvaldo Domingo   YOB: 1950   Date of Visit: 2/14/2025       Dear Dominick Cheema MD  [id number]  [place] [address]    The attached plan of care is being sent to you because your patient’s medical reimbursement requires that you certify the plan of care. Your signature is required to allow uninterrupted insurance coverage.      You may indicate your approval by signing below and faxing this form back to us at Dept Fax: [phone].    Please call Dept: [phone] with any questions or concerns.    Thank you for this referral,        Kendall Cannon PT  [facility] [address]  [address] [id number] [phone]    Payer: Payor: GENERIC COMMERCIAL / Plan: GENERIC COMMERCIAL / Product Type: *No Product type* /                                                                         Date:     Dear Kendall Cannon PT,     Re: Mr. Uvaldo Domingo, MRN:[id number]    I certify that I have reviewed the attached plan of care and it is medically necessary for Mr. Uvaldo Domingo (1950) who is under my care.          ______________________________________                    _________________  Provider name and credentials                                           Date and time                                                                                           Plan of Care 2/14/25   Effective from: 2/14/2025  Effective to: 5/1/2025    Plan ID: [id number]            Participants as of Finalize on 2/17/2025    Name Type Comments Contact Info    Dominick Cheema MD Referring Provider  [phone]    Kendall Cannon PT Physical Therapist  [phone]       Last Plan Note     Author: Kendall Cannon PT Status: Incomplete Last edited: 2/14/2025  2:00 PM       Physical Therapy Evaluation    Patient Name: Uvaldo Domingo  MRN:  [id number]  Today's Date: 2/17/2025  Time Calculation  Start Time: 0208  Stop Time: 0248  Time Calculation (min): 40 min    Visit #: 1.  Visits approved: 60 PT/OT.  Certification dates: 2/14/25-5/10/25.    Precautions:  Precautions  Precautions Comment: None. Low fall risk.    Subjective/History    DOI: 8/1/24.  Mechanism of injury: Insidious onset.  Area of symptoms: Intermittent soreness at right ant shoulder.     Aggravating factors: Don seat belt. Wakes sec to pain. Don jacket. Right sidelying.   Easing factors: Steroid injection ~1 wk ago.  Red flags: None.   Occupation: Retired.  Recreation/Hobbies/Sports: Biking- not recently sec to weather.  Living situation: Unremarkable.   Barriers to treatment: None.   Preferred language: English.    Objective Findings  Observation/gait: Mild right scap winging.   AROM Shoulder flex: wnl. Abd: wnl. ER: wnl- mild pain. Hbb: ~T9- pain. Left ~T9.  Muscle activation/Resisted testing: Abd, IR, ER all 4+/5. Speeds: 4-/5- pain.   Special tests: Gabe-Jesse: positive for pain. Alonso-relocation: Neg. Crank: Neg. Clunk: Neg.   Palpation: TTP LH biceps.     Treatment:   Therex: Tband row, Tband bilat ER- each to HEP 2x20, 2x/day.     Assessment  Patient presents with resisted testing and palpation findings consistent with LH biceps tendinopathy.    Plan  Physical therapy 1-2 times/week for 6-8 wks. Therapy may include the following: Therapeutic exercise, manual therapy, education/home program.     Goals: Don seat belt w/o pain. Don jacket w/o pain. Lie on right side for sleeping w/o pain.              Current Participants as of 2/17/2025    Name Type Comments Contact Info    Dominick Cheema MD Referring Provider  [phone]    Signature pending    Kendall Cannon PT Physical Therapist  [phone]    Signature pending

## 2025-02-14 NOTE — PROGRESS NOTES
Physical Therapy Evaluation    Patient Name: Uvaldo Domingo  MRN: [id number]  Today's Date: 2/17/2025  Time Calculation  Start Time: 0208  Stop Time: 0248  Time Calculation (min): 40 min    Visit #: 1.  Visits approved: 60 PT/OT.  Certification dates: 2/14/25-5/10/25.    Precautions:  Precautions  Precautions Comment: None. Low fall risk.    Subjective/History    DOI: 8/1/24.  Mechanism of injury: Insidious onset.  Area of symptoms: Intermittent soreness at right ant shoulder.     Aggravating factors: Don seat belt. Wakes sec to pain. Don jacket. Right sidelying.   Easing factors: Steroid injection ~1 wk ago.  Red flags: None.   Occupation: Retired.  Recreation/Hobbies/Sports: Biking- not recently sec to weather.  Living situation: Unremarkable.   Barriers to treatment: None.   Preferred language: English.    Objective Findings  Observation/gait: Mild right scap winging.   AROM Shoulder flex: wnl. Abd: wnl. ER: wnl- mild pain. Hbb: ~T9- pain. Left ~T9.  Muscle activation/Resisted testing: Abd, IR, ER all 4+/5. Speeds: 4-/5- pain.   Special tests: Bernal-Jesse: positive for pain. Alonso-relocation: Neg. Crank: Neg. Clunk: Neg.   Palpation: TTP LH biceps.     Treatment:   Therex: Tband row, Tband bilat ER- each to HEP 2x20, 2x/day.     Assessment  Patient presents with resisted testing and palpation findings consistent with LH biceps tendinopathy.    Plan  Physical therapy 1-2 times/week for 6-8 wks. Therapy may include the following: Therapeutic exercise, manual therapy, education/home program.     Goals: Don seat belt w/o pain. Don jacket w/o pain. Lie on right side for sleeping w/o pain.

## 2025-02-17 PROBLEM — M25.511 RIGHT SHOULDER PAIN: Status: ACTIVE | Noted: 2025-02-17

## 2025-02-17 PROBLEM — Z87.19 H/O UMBILICAL HERNIA REPAIR: Status: ACTIVE | Noted: 2025-02-17

## 2025-02-17 PROBLEM — R44.8 FACIAL PRESSURE: Status: RESOLVED | Noted: 2023-06-22 | Resolved: 2025-02-17

## 2025-02-17 PROBLEM — Z98.890 H/O UMBILICAL HERNIA REPAIR: Status: ACTIVE | Noted: 2025-02-17

## 2025-02-17 PROBLEM — D64.9 MILD ANEMIA: Status: ACTIVE | Noted: 2025-02-17

## 2025-02-18 ENCOUNTER — APPOINTMENT (OUTPATIENT)
Dept: PHYSICAL THERAPY | Facility: CLINIC | Age: 75
End: 2025-02-18
Payer: COMMERCIAL

## 2025-02-21 ENCOUNTER — TREATMENT (OUTPATIENT)
Dept: PHYSICAL THERAPY | Facility: CLINIC | Age: 75
End: 2025-02-21
Payer: COMMERCIAL

## 2025-02-21 DIAGNOSIS — M25.511 RIGHT SHOULDER PAIN, UNSPECIFIED CHRONICITY: Primary | ICD-10-CM

## 2025-02-21 PROCEDURE — 97110 THERAPEUTIC EXERCISES: CPT | Mod: GP | Performed by: PHYSICAL THERAPIST

## 2025-02-21 PROCEDURE — 97140 MANUAL THERAPY 1/> REGIONS: CPT | Mod: GP | Performed by: PHYSICAL THERAPIST

## 2025-02-21 NOTE — PROGRESS NOTES
Physical Therapy Follow-up    Patient Name: Uvaldo Domingo  MRN: [id number]  Today's Date: 2/21/2025  Time Calculation  Start Time: 0204  Stop Time: 0235  Time Calculation (min): 31 min      Visit#: 2. 60 approved.  Cert dates: 2/14/25-5/10/25    Precautions: None.    Subjective: 0/10 resting pain today. Easier to don jacket and reach up. HEP going well.     Objective: AROM shoulder flex: wnl. Abd: wnl. ER: wnl- pain.     Treatment:   Therapeutic exercise: Tband row, Tband bilat ER- demo'd correctly.   Tband IR- HEP 30x, 2x/day.   Tband hand walks up wall- HEP 5x, 2x/day.     Manual therapy: IV- ER, caud in abd// ER: no change.      Assessment: No change with jt mobs. Demo's HEP correctly.     Plan: Hold PT  (pt leaving town for ~2 mo) for trial of self management.

## 2025-03-10 ENCOUNTER — TELEPHONE (OUTPATIENT)
Dept: SURGERY | Facility: CLINIC | Age: 75
End: 2025-03-10
Payer: COMMERCIAL

## 2025-03-10 NOTE — TELEPHONE ENCOUNTER
Pt wanted to know if it is safe to start doing core exercises for his back.  Surgery x umbilical hernia on 1/10/25    Per provider, okay to start exercises.  Pt made aware.

## 2025-04-02 DIAGNOSIS — M17.12 ARTHRITIS OF KNEE, LEFT: Primary | ICD-10-CM

## 2025-05-14 ENCOUNTER — OFFICE VISIT (OUTPATIENT)
Dept: ORTHOPEDIC SURGERY | Facility: CLINIC | Age: 75
End: 2025-05-14
Payer: COMMERCIAL

## 2025-05-14 ENCOUNTER — HOSPITAL ENCOUNTER (OUTPATIENT)
Dept: RADIOLOGY | Facility: CLINIC | Age: 75
Discharge: HOME | End: 2025-05-14
Payer: COMMERCIAL

## 2025-05-14 DIAGNOSIS — M17.0 PRIMARY OSTEOARTHRITIS OF BOTH KNEES: ICD-10-CM

## 2025-05-14 DIAGNOSIS — M17.12 PRIMARY OSTEOARTHRITIS OF LEFT KNEE: Primary | ICD-10-CM

## 2025-05-14 PROCEDURE — 73564 X-RAY EXAM KNEE 4 OR MORE: CPT | Mod: BILATERAL PROCEDURE | Performed by: ORTHOPAEDIC SURGERY

## 2025-05-14 PROCEDURE — 99202 OFFICE O/P NEW SF 15 MIN: CPT | Mod: 25 | Performed by: ORTHOPAEDIC SURGERY

## 2025-05-14 PROCEDURE — 20610 DRAIN/INJ JOINT/BURSA W/O US: CPT | Mod: LT | Performed by: ORTHOPAEDIC SURGERY

## 2025-05-14 PROCEDURE — 2500000004 HC RX 250 GENERAL PHARMACY W/ HCPCS (ALT 636 FOR OP/ED): Performed by: ORTHOPAEDIC SURGERY

## 2025-05-14 PROCEDURE — 1159F MED LIST DOCD IN RCRD: CPT | Performed by: ORTHOPAEDIC SURGERY

## 2025-05-14 PROCEDURE — 99204 OFFICE O/P NEW MOD 45 MIN: CPT | Performed by: ORTHOPAEDIC SURGERY

## 2025-05-14 PROCEDURE — 1160F RVW MEDS BY RX/DR IN RCRD: CPT | Performed by: ORTHOPAEDIC SURGERY

## 2025-05-14 PROCEDURE — 1036F TOBACCO NON-USER: CPT | Performed by: ORTHOPAEDIC SURGERY

## 2025-05-14 PROCEDURE — 73564 X-RAY EXAM KNEE 4 OR MORE: CPT | Mod: 50

## 2025-05-14 RX ADMIN — BETAMETHASONE ACETATE AND BETAMETHASONE SODIUM PHOSPHATE 2 ML: 3; 3 INJECTION, SUSPENSION INTRA-ARTICULAR; INTRALESIONAL; INTRAMUSCULAR; SOFT TISSUE at 08:43

## 2025-05-14 RX ADMIN — LIDOCAINE HYDROCHLORIDE 4 ML: 10 INJECTION, SOLUTION INFILTRATION; PERINEURAL at 08:43

## 2025-05-14 NOTE — PROGRESS NOTES
New patient to me 74 old gentleman presents for evaluation of knee pain he had a right unicondylar knee replacement done in [place] the right knee has been doing very well but the left knee is giving him trouble he is having popping clicking grinding and swelling in the left knee no new injury.  He does take Tylenol sometimes.  Knee pain is getting progressively worse he is having significant impairment of body function related to the left knee.    Location of pain: Anterior and medial aspect left knee.  Quality of pain: Chronic pain for several years getting progressively worse  Modifying factors: Pain is on and off worse when he goes up and down stairs he tries to stay active and walk and ride his bike does not have any trouble when he rides his bike but when he walks he has knee pain  Associated signs and symptoms: Some stiffness in his knee some swelling some popping clicking grinding no numbness or tingling  Previous treatment: He is try Tylenol for pain he has a history of a right knee arthroscopy and a right unicondylar knee replacement        The patient's past medical history, family history, social history, and review of systems were documented on the patient's medical intake form.  The medical intake form was reviewed and scanned into the electronic medical record for future use.  History is otherwise negative except as stated in the HPI.    Physical exam    General: Alert and oriented to place, person, and time.  No acute distress and breathing comfortably; pleasant and cooperative with the examination.  HEENT: Head is normocephalic and atraumatic.  Neck: Supple, no visible swelling.  Cardiovascular: Good perfusion to the affected extremity.  Lungs: No audible wheezing or labored breathing.  Abdomen: Nondistended  HEME/Lymph : No visible abnormalities bilateral lower extremity    Extremity:  Left knee the affected knee was examined and inspected and was tender to touch along the medial aspect.   The patient has catching/locking and occasional mechanical symptoms.  The skin was intact without breakdown or open wounds.  There was a mild Stacey exam seen with mild evidence of instability and weakness in the collateral ligaments with laxity to varus valgus stress and in the anterior posterior plane.  There was a negative Lachman's test, pivot shift test, and posterior drawer sign.  There was no foot drop, numbness or tingling.  Sensation, reflexes, and pulses in the foot and ankle were present.  There was an effusion but range of motion was good and straight leg raise testing was normal.   The patient had the ability to bear weight but with discomfort.  The patient's gait was antalgic secondary to the discomfort. Knee range of motion was 5-115    Diagnostics:      Imaging  No results found.    Cardiology, Vascular, and Other Imaging  XR knee 4+ views bilateral  Result Date: 5/14/2025  Interpreted By:  Gary Mckeon, STUDY: XR KNEE 4+ VIEWS BILATERAL; 5/14/2025 8:14 am   INDICATION: Signs/Symptoms:pain.   ACCESSION NUMBER(S): [id number]   ORDERING CLINICIAN: GARY MCKEON   FINDINGS: Weightbearing four views both knees. Status post medial unicondylar knee replacement right knee in good alignment without bony abnormalities left knee show significant medial compartment arthritis with joint space narrowing and marginal osteophyte formation no signs of fracture dislocation or other bony abnormality     Signed by: Gary Mckeon 5/14/2025 9:18 AM Dictation workstation:   [id number]           Procedures  See dictated procedure note    Assessment:  Left knee arthritis    Treatment plan:  1.  The natural history of the condition and its associated treatment alternatives including surgical and nonsurgical options were discussed with the patient at length.  2.  Patient with significant impairment of bodily function related to his left knee arthritis we discussed surgical nonsurgical option he would like  to proceed with left knee cortisone injection which is performed today with his consent without complication.  Patient understands the cortisone may provide temporary relief how much it helps her how long it lasts is unpredictable.  Cortisone can be repeated after 3 months if symptoms return.  He is temo continue to weight-bear as tolerated and see back in a month to assess results.  3. [   ]  4.  All of the patient's questions were answered.    Orders Placed This Encounter    Inj/Asp: Left knee    XR knee 4+ views bilateral       This note was prepared using voice recognition software.  The details of this note are correct and have been reviewed, and corrected to the best of my ability.  Some grammatical areas may persist related to the Dragon software    Anant Mckeon MD  Senior Attending Physician  [facility]    [phone]    Inj/Asp: Left knee on 5/14/2025 8:43 AM  Indications: pain and diagnostic evaluation  Details: 22 G needle, anteromedial approach  Medications: 2 mL betamethasone acet,sod phos 6 mg/mL; 4 mL lidocaine 10 mg/mL (1 %)  Outcome: tolerated well, no immediate complications  Procedure, treatment alternatives, risks and benefits explained, specific risks discussed. Consent was given by the patient. Immediately prior to procedure a time out was called to verify the correct patient, procedure, equipment, support staff and site/side marked as required. Patient was prepped and draped in the usual sterile fashion.

## 2025-05-15 RX ORDER — BETAMETHASONE SODIUM PHOSPHATE AND BETAMETHASONE ACETATE 3; 3 MG/ML; MG/ML
2 INJECTION, SUSPENSION INTRA-ARTICULAR; INTRALESIONAL; INTRAMUSCULAR; SOFT TISSUE
Status: COMPLETED | OUTPATIENT
Start: 2025-05-14 | End: 2025-05-14

## 2025-05-15 RX ORDER — LIDOCAINE HYDROCHLORIDE 10 MG/ML
4 INJECTION, SOLUTION INFILTRATION; PERINEURAL
Status: COMPLETED | OUTPATIENT
Start: 2025-05-14 | End: 2025-05-14

## 2025-07-01 ENCOUNTER — OFFICE VISIT (OUTPATIENT)
Dept: ORTHOPEDIC SURGERY | Facility: CLINIC | Age: 75
End: 2025-07-01
Payer: COMMERCIAL

## 2025-07-01 DIAGNOSIS — M17.0 PRIMARY OSTEOARTHRITIS OF BOTH KNEES: Primary | ICD-10-CM

## 2025-07-01 PROCEDURE — 99212 OFFICE O/P EST SF 10 MIN: CPT | Performed by: ORTHOPAEDIC SURGERY

## 2025-07-01 RX ORDER — DICLOFENAC SODIUM 10 MG/G
GEL TOPICAL
Qty: 150 G | Refills: 2 | Status: SHIPPED | OUTPATIENT
Start: 2025-07-01

## 2025-07-01 NOTE — PROGRESS NOTES
Subjective    Patient ID: Uvaldo    Chief Complaint:   Chief Complaint   Patient presents with    Left Knee - Pain, Osteoarthritis     S/p CSI- 05/14/25    Right Knee - Pain, Osteoarthritis     History of RT MMT 12/5/18 done in [facility]      History of present illness    74-year-old gentleman presenting to the clinic today for follow-up evaluation left knee pain after cortisone injection performed 5/14/2025.  He states that the cortisone injection did help with his medial knee pain.  Still has some discomfort but mostly located over the lateral aspect.  Describes no instability at this time.  He stays fairly active biking 40 to 50 miles per week without much discomfort.  He states he still has difficulty with walking.  He is on Eliquis so he is limited as to what he can take for inflammation and pain.  He just takes Tylenol which takes the edge off but this is mostly for other joints that give him problems.      Past medical , Surgical, Family and social history reviewed.      Physical exam  General: No acute distress and breathing comfortably.  Patient is pleasant and cooperative with the examination.    Extremity  Left knee is neurovascular intact.  Range of motion 0 to 125 degrees.  No instability.  Varus deformity.  No calf swelling or tenderness.  Minimal tenderness palpation medial compartment.  Tightness and tenderness palpation of the IT band.  No infection.    Diagnostics  [ none]  Imaging  No results found.    Cardiology, Vascular, and Other Imaging  No other imaging results found for the past 7 days       Procedure  [ none]    Assessment  Left knee osteoarthritis    Treatment plan  1.  At this time he was encouraged to continue with weightbearing activity as tolerated including biking  2.  Prescription Voltaren gel sent to the pharmacy to help with inflammation he can still take Tylenol and his Eliquis with this medication  3.  He will follow-up with us in 6 weeks for reevaluation without x-ray.  If  he still having pain at that time we may proceed with a repeat cortisone injection.  For complete plan and/or surgical details, please refer to Dr. Mckeon's portion of the split/shared dictation.  4.  All of the patient's questions were answered.    No orders of the defined types were placed in this encounter.      This note was prepared using voice recognition software.  The details of this note are correct and have been reviewed, and corrected to the best of my ability.  Some grammatical areas may persist related to the Dragon software    Yusef Ramos PA-C, [facility]  [facility] [place]    [phone]    In a face-to-face encounter performed today, I Anant Mckeon MD performed a history and physical examination, discussed pertinent diagnostic studies if indicated, and discussed diagnosis and management strategies with both the patient and the midlevel provider.  I reviewed the midlevel's note and agree with the documented findings and plan of care.  Greater than 50% of the evaluation and treatment decision was performed by the physician myself during today's visit.    74-year-old gentleman here for follow-up of his knee pain he had a left knee cortisone injection on 5/14/2025 he takes Eliquis so he only can take Tylenol for arthritis states he is doing much better not perfect but he is back to riding his bike he is back to doing the activities that he wants to do still has a limp at times no numbness or tingling no fevers or chills he is riding 40 to 50 miles a week.  On examination he has good range of motion of his knee mild tenderness along the IT band laterally no instability brisk cap refill Carton soft calf is nontender.  Plan is continue with his activity as tolerated prescription for Voltaren gel sent to pharmacy.  Follow-up if he has increased pain or discomfort otherwise as needed.          This note was prepared using voice recognition software.  The details of this note  are correct and have been reviewed, and corrected to the best of my ability.  Some grammatical areas may persist related to the Dragon software    Anant Mckeon MD  Senior Attending Physician  [facility]    [phone]

## 2025-07-08 LAB
ALT SERPL-CCNC: 14 U/L (ref 9–46)
ANION GAP SERPL CALCULATED.4IONS-SCNC: 10 MMOL/L (CALC) (ref 7–17)
BUN SERPL-MCNC: 12 MG/DL (ref 7–25)
BUN/CREAT SERPL: ABNORMAL (CALC) (ref 6–22)
CALCIUM SERPL-MCNC: 8.8 MG/DL (ref 8.6–10.3)
CHLORIDE SERPL-SCNC: 108 MMOL/L (ref 98–110)
CHOLEST SERPL-MCNC: 130 MG/DL
CHOLEST/HDLC SERPL: 2.2 (CALC)
CO2 SERPL-SCNC: 23 MMOL/L (ref 20–32)
CREAT SERPL-MCNC: 0.88 MG/DL (ref 0.7–1.28)
EGFRCR SERPLBLD CKD-EPI 2021: 89 ML/MIN/1.73M2
ERYTHROCYTE [DISTWIDTH] IN BLOOD BY AUTOMATED COUNT: 12.1 % (ref 11–15)
EST. AVERAGE GLUCOSE BLD GHB EST-MCNC: 123 MG/DL
EST. AVERAGE GLUCOSE BLD GHB EST-SCNC: 6.8 MMOL/L
GLUCOSE SERPL-MCNC: 101 MG/DL (ref 65–99)
HBA1C MFR BLD: 5.9 %
HCT VFR BLD AUTO: 42.9 % (ref 38.5–50)
HDLC SERPL-MCNC: 60 MG/DL
HGB BLD-MCNC: 14.4 G/DL (ref 13.2–17.1)
LDLC SERPL CALC-MCNC: 54 MG/DL (CALC)
MCH RBC QN AUTO: 31.7 PG (ref 27–33)
MCHC RBC AUTO-ENTMCNC: 33.6 G/DL (ref 32–36)
MCV RBC AUTO: 94.5 FL (ref 80–100)
NONHDLC SERPL-MCNC: 70 MG/DL (CALC)
PLATELET # BLD AUTO: 171 THOUSAND/UL (ref 140–400)
PMV BLD REES-ECKER: 10.2 FL (ref 7.5–12.5)
POTASSIUM SERPL-SCNC: 4.2 MMOL/L (ref 3.5–5.3)
PSA SERPL-MCNC: 1.96 NG/ML
RBC # BLD AUTO: 4.54 MILLION/UL (ref 4.2–5.8)
SODIUM SERPL-SCNC: 141 MMOL/L (ref 135–146)
TRIGL SERPL-MCNC: 76 MG/DL
WBC # BLD AUTO: 4.5 THOUSAND/UL (ref 3.8–10.8)

## 2025-07-09 ENCOUNTER — OFFICE VISIT (OUTPATIENT)
Dept: ORTHOPEDIC SURGERY | Facility: CLINIC | Age: 75
End: 2025-07-09
Payer: COMMERCIAL

## 2025-07-09 ENCOUNTER — HOSPITAL ENCOUNTER (OUTPATIENT)
Dept: RADIOLOGY | Facility: EXTERNAL LOCATION | Age: 75
Discharge: HOME | End: 2025-07-09

## 2025-07-09 DIAGNOSIS — M19.011 BILATERAL SHOULDER REGION ARTHRITIS: Primary | ICD-10-CM

## 2025-07-09 DIAGNOSIS — M19.012 BILATERAL SHOULDER REGION ARTHRITIS: Primary | ICD-10-CM

## 2025-07-09 PROCEDURE — 99213 OFFICE O/P EST LOW 20 MIN: CPT | Mod: 25 | Performed by: PHYSICAL MEDICINE & REHABILITATION

## 2025-07-09 PROCEDURE — 99213 OFFICE O/P EST LOW 20 MIN: CPT | Performed by: PHYSICAL MEDICINE & REHABILITATION

## 2025-07-09 PROCEDURE — 20611 DRAIN/INJ JOINT/BURSA W/US: CPT | Mod: 50 | Performed by: PHYSICAL MEDICINE & REHABILITATION

## 2025-07-09 PROCEDURE — 1159F MED LIST DOCD IN RCRD: CPT | Performed by: PHYSICAL MEDICINE & REHABILITATION

## 2025-07-09 PROCEDURE — 2500000004 HC RX 250 GENERAL PHARMACY W/ HCPCS (ALT 636 FOR OP/ED): Performed by: PHYSICAL MEDICINE & REHABILITATION

## 2025-07-09 RX ORDER — TRIAMCINOLONE ACETONIDE 40 MG/ML
40 INJECTION, SUSPENSION INTRA-ARTICULAR; INTRAMUSCULAR
Status: COMPLETED | OUTPATIENT
Start: 2025-07-09 | End: 2025-07-09

## 2025-07-09 RX ADMIN — TRIAMCINOLONE ACETONIDE 40 MG: 40 INJECTION, SUSPENSION INTRA-ARTICULAR; INTRAMUSCULAR at 10:16

## 2025-07-09 SDOH — SOCIAL STABILITY: SOCIAL NETWORK: SOCIAL ACTIVITY:: 8

## 2025-07-09 NOTE — PROGRESS NOTES
PM&R  / Ortho clinic eval:    IMPRESSION:  Resolved- Chronic low back pain almost certainly due to lumbar spondylosis, right lower lumbar  New onset chronic right shoulder pain likely due to primary osteoarthritis of R shoulder.    RECOMMENDATIONS:  -Xrays reviewed of R shoulder, notable arthritic changes as compared to 2013 especially spuring on humerus.    - Physical therapy for Right Shoulder  - R shoulder injection  with ultrasound; R subacromial & R glenohumeral, performed in office today - anterior glenohumeral.  - Follow up after PT    Diagnoses and plan discussed with the patient, patient educated on above diagnoses and treatments, including alternatives     Supervisory note:  Seen with Dr Kayode Finnegan, resident.  I saw and evaluated the patient. I personally obtained the key and critical portions of the history and physical exam. I reviewed the resident's documentation and discussed the patient with the resident. I agree with the resident's medical decision making as documented in the resident's note.       R shoulder injection Feb '25 lasted 4 mo, now left side hurting  pain since ~Aug 24, can still fly fish.  exam has some impingement also so we did GH and subacromial injection both sides today.  Continue home  PT   F/u PRN 4mo but we previously discussed possible PRP injection as next step (would need to see sports med / Isa or Brandt?) for that    Dominick Cheema M.D, FAAPMR, R-MSK  Chief, Division of PM&R  Board Certified in PM&R, Sports Medicine and Musculoskeletal Ultrasound  Carbon copy : Vanna LAZCANO  ____________________________________________________________________    7/9/2025    CC: Patient complains of RUE shoulder pain for several months    HPI:  Patient is a very pleasant 74 year old male presenting with right shoulder pain.     Update 2/6/25: Right shoulder GH and SAB injection worked really well x 4 mo continues to be active, but PT exercises started bothering it recently. Then Left  "side hurting similarly 1-2mo, more lateraly worse reaching better rest, moderate no Rad or mech sx   Patient denies any pain with overhead activity. Patient denies dropping things or weakness. Patient denies any associated neck pain.    Patient does have a distant history of rotator cuff tear in R shoulder in approx 2013 after skiing. He did not have that repaired and the pain went away on its own without therapy.     Recall Patient is sleeping on his side, and has increased comfort when the bed is flat. Patient takes tylenol periodically.   Patient describes pain in his R elbow after falling on it in September 2024. Patient describes an \"electrical feeling.\" Patient denies any numbness and tingling in the hand.   Patient is right handed, He does do fly fishing periodically.    Patient states his pain on average is 2-3/10. With movement it reaches a 8-9/10.    Recall,    Retired [employer]  at [employer].  Enjoys bicycling H/o LBP since late 60s gradually worse -    Seen at the request of pcp Dr Prabhakar.    The pain is axial only without radiation, achy deep bilateral  increases with bending and reaching forward and is relieved by rest especially sitting, lumbar support.  Ibuprofen only for very severe  episodies    Patient reports no fevers, chills, sweats, night pain, weight loss or cancer history no bowel . Bladder issues.      Pertinent Physical Exam:  Neuro: Normal Sensation, strength, bulk and tone of upper limbs bilaterally, reflexes normal.  Looney's negative.  B  Shoulder exam Full ROM in flexion and abduction , no effusion,  tender to palpation at b/l SAB mpingement signs pos, negative scarf test,   Pain with internal rotation, external rotation. Able to reach to low back on internal rotation.   No tenderness to palpation of AC joint, or biceps tendon  Scapular kinetics WNL  Impingement positive empty can on R side    SUPPORTING DOCUMENTATION (remaining history, exam, other findings):  Work-up " "reviewed - XR R shoulder with arthritic changes    Treatment has included ibuprofen, rest.    See above for Assessment and Plan     Patient ID: Uvaldo Domingo is a 74 y.o. male.    L Inj/Asp: bilateral subacromial bursa & glenohumeral (R subacromial bursa & R glenohumeral) on 7/9/2025 10:16 AM  Indications: pain  Details: 25 G needle, ultrasound-guided posterior approach  Medications (Right): 40 mg triamcinolone acetonide 40 mg/mL  Medications (Left): 40 mg triamcinolone acetonide 40 mg/mL  Outcome: tolerated well, no immediate complications    1.5 kenalog and 3.5 lidocaine, 5cc total    \"Ultrasound interpretation was performed prior to the procedure to identify the target and any adjacent neurovascular structures.  Subsequent interpretation was performed during real-time needle guidance and post-intervention confirming appropriate injectate flow and hemostasis.  Images were permanently stored in our PACS system, EMR or local secure device as needed.\"      Procedure, treatment alternatives, risks and benefits explained, specific risks discussed. Consent was given by the patient. Immediately prior to procedure a time out was called to verify the correct patient, procedure, equipment, support staff and site/side marked as required. Patient was prepped and draped in the usual sterile fashion.         "

## 2025-07-23 DIAGNOSIS — G47.09 OTHER INSOMNIA: ICD-10-CM

## 2025-07-23 RX ORDER — TRAZODONE HYDROCHLORIDE 50 MG/1
50 TABLET ORAL NIGHTLY PRN
Qty: 90 TABLET | Refills: 3 | Status: SHIPPED | OUTPATIENT
Start: 2025-07-23

## 2025-07-28 ENCOUNTER — OFFICE VISIT (OUTPATIENT)
Dept: CARDIOLOGY | Facility: CLINIC | Age: 75
End: 2025-07-28
Payer: COMMERCIAL

## 2025-07-28 ENCOUNTER — APPOINTMENT (OUTPATIENT)
Dept: CARDIOLOGY | Facility: CLINIC | Age: 75
End: 2025-07-28
Payer: COMMERCIAL

## 2025-07-28 ENCOUNTER — APPOINTMENT (OUTPATIENT)
Dept: PRIMARY CARE | Facility: CLINIC | Age: 75
End: 2025-07-28
Payer: COMMERCIAL

## 2025-07-28 VITALS
WEIGHT: 199.8 LBS | SYSTOLIC BLOOD PRESSURE: 124 MMHG | BODY MASS INDEX: 28.6 KG/M2 | DIASTOLIC BLOOD PRESSURE: 82 MMHG | OXYGEN SATURATION: 96 % | HEIGHT: 70 IN | HEART RATE: 63 BPM

## 2025-07-28 DIAGNOSIS — E66.3 OVERWEIGHT WITH BODY MASS INDEX (BMI) 25.0-29.9: ICD-10-CM

## 2025-07-28 DIAGNOSIS — E78.5 DYSLIPIDEMIA, GOAL LDL BELOW 70: ICD-10-CM

## 2025-07-28 DIAGNOSIS — N40.1 BENIGN PROSTATIC HYPERPLASIA WITH NOCTURIA: ICD-10-CM

## 2025-07-28 DIAGNOSIS — D12.6 TUBULAR ADENOMA OF COLON: ICD-10-CM

## 2025-07-28 DIAGNOSIS — R73.03 PREDIABETES: ICD-10-CM

## 2025-07-28 DIAGNOSIS — M17.12 ARTHRITIS OF KNEE, LEFT: ICD-10-CM

## 2025-07-28 DIAGNOSIS — Z00.00 ANNUAL PHYSICAL EXAM: Primary | ICD-10-CM

## 2025-07-28 DIAGNOSIS — K21.9 GASTROESOPHAGEAL REFLUX DISEASE WITH HIATAL HERNIA: ICD-10-CM

## 2025-07-28 DIAGNOSIS — Z23 IMMUNIZATION DUE: ICD-10-CM

## 2025-07-28 DIAGNOSIS — I48.92 PAROXYSMAL ATRIAL FLUTTER (MULTI): Primary | ICD-10-CM

## 2025-07-28 DIAGNOSIS — J30.2 SEASONAL ALLERGIES: ICD-10-CM

## 2025-07-28 DIAGNOSIS — K44.9 GASTROESOPHAGEAL REFLUX DISEASE WITH HIATAL HERNIA: ICD-10-CM

## 2025-07-28 DIAGNOSIS — I71.21 ANEURYSM OF ASCENDING AORTA WITHOUT RUPTURE: ICD-10-CM

## 2025-07-28 DIAGNOSIS — H91.13 PRESBYCUSIS OF BOTH EARS: ICD-10-CM

## 2025-07-28 DIAGNOSIS — I10 ESSENTIAL HYPERTENSION WITH GOAL BLOOD PRESSURE LESS THAN 130/85: ICD-10-CM

## 2025-07-28 DIAGNOSIS — R35.1 BENIGN PROSTATIC HYPERPLASIA WITH NOCTURIA: ICD-10-CM

## 2025-07-28 DIAGNOSIS — D64.9 MILD ANEMIA: ICD-10-CM

## 2025-07-28 PROCEDURE — 99214 OFFICE O/P EST MOD 30 MIN: CPT | Performed by: INTERNAL MEDICINE

## 2025-07-28 PROCEDURE — 1036F TOBACCO NON-USER: CPT | Performed by: INTERNAL MEDICINE

## 2025-07-28 PROCEDURE — 1159F MED LIST DOCD IN RCRD: CPT | Performed by: INTERNAL MEDICINE

## 2025-07-28 PROCEDURE — 93005 ELECTROCARDIOGRAM TRACING: CPT | Performed by: INTERNAL MEDICINE

## 2025-07-28 PROCEDURE — 99397 PER PM REEVAL EST PAT 65+ YR: CPT | Performed by: INTERNAL MEDICINE

## 2025-07-28 PROCEDURE — 1160F RVW MEDS BY RX/DR IN RCRD: CPT | Performed by: INTERNAL MEDICINE

## 2025-07-28 PROCEDURE — 3078F DIAST BP <80 MM HG: CPT | Performed by: INTERNAL MEDICINE

## 2025-07-28 PROCEDURE — 93010 ELECTROCARDIOGRAM REPORT: CPT | Performed by: INTERNAL MEDICINE

## 2025-07-28 PROCEDURE — 3074F SYST BP LT 130 MM HG: CPT | Performed by: INTERNAL MEDICINE

## 2025-07-28 PROCEDURE — 99212 OFFICE O/P EST SF 10 MIN: CPT | Mod: 25

## 2025-07-28 RX ORDER — CEFADROXIL 500 MG/1
1 CAPSULE ORAL
COMMUNITY
Start: 2025-07-22 | End: 2025-07-28 | Stop reason: WASHOUT

## 2025-07-28 RX ORDER — PNEUMOCOCCAL 20-VALENT CONJUGATE VACCINE 2.2; 2.2; 2.2; 2.2; 2.2; 2.2; 2.2; 2.2; 2.2; 2.2; 2.2; 2.2; 2.2; 2.2; 2.2; 2.2; 4.4; 2.2; 2.2; 2.2 UG/.5ML; UG/.5ML; UG/.5ML; UG/.5ML; UG/.5ML; UG/.5ML; UG/.5ML; UG/.5ML; UG/.5ML; UG/.5ML; UG/.5ML; UG/.5ML; UG/.5ML; UG/.5ML; UG/.5ML; UG/.5ML; UG/.5ML; UG/.5ML; UG/.5ML; UG/.5ML
0.5 INJECTION, SUSPENSION INTRAMUSCULAR ONCE
Qty: 0.5 ML | Refills: 0 | Status: SHIPPED | OUTPATIENT
Start: 2025-07-28 | End: 2025-07-28

## 2025-07-28 ASSESSMENT — COLUMBIA-SUICIDE SEVERITY RATING SCALE - C-SSRS
6. HAVE YOU EVER DONE ANYTHING, STARTED TO DO ANYTHING, OR PREPARED TO DO ANYTHING TO END YOUR LIFE?: NO
1. IN THE PAST MONTH, HAVE YOU WISHED YOU WERE DEAD OR WISHED YOU COULD GO TO SLEEP AND NOT WAKE UP?: NO
2. HAVE YOU ACTUALLY HAD ANY THOUGHTS OF KILLING YOURSELF?: NO

## 2025-07-28 ASSESSMENT — ENCOUNTER SYMPTOMS
OCCASIONAL FEELINGS OF UNSTEADINESS: 0
DEPRESSION: 0
LOSS OF SENSATION IN FEET: 0

## 2025-07-28 NOTE — PROGRESS NOTES
"Subjective   Patient ID: Uvaldo Domingo is a 74 y.o. male who presents for Annual Exam.    Here for wellness visit.  Generally feels better with less back pain with exercises.  Saw his urologist last month and his eye doctor earlier this summer.  No cataracts.    He is cycling 3-4 times weekly and notes he has less left knee pain    Recently had another pair of injections earlier this month with Dr. Bravo-shoulders are doing fairly well.  Sleeping differently at night which also helps         Review of Systems    Objective   /82   Pulse 63   Ht 1.782 m (5' 10.16\")   Wt 90.6 kg (199 lb 12.8 oz)   SpO2 96%   BMI 28.54 kg/m²     Physical Exam  Constitutional:       Appearance: Normal appearance.   HENT:      Head: Normocephalic and atraumatic.      Right Ear: Tympanic membrane normal.      Left Ear: Tympanic membrane normal.      Nose: Nose normal.     Eyes:      General: No scleral icterus.     Extraocular Movements: Extraocular movements intact.      Conjunctiva/sclera: Conjunctivae normal.      Pupils: Pupils are equal, round, and reactive to light.       Cardiovascular:      Rate and Rhythm: Normal rate and regular rhythm.      Pulses: Normal pulses.      Heart sounds: Murmur heard.   Pulmonary:      Effort: Pulmonary effort is normal. No respiratory distress.      Breath sounds: Normal breath sounds. No stridor. No wheezing.   Abdominal:      General: Abdomen is flat. Bowel sounds are normal. There is no distension.      Palpations: Abdomen is soft. There is no mass.      Tenderness: There is no abdominal tenderness. There is no guarding.     Musculoskeletal:         General: No swelling, tenderness or deformity. Normal range of motion.      Cervical back: Normal range of motion and neck supple. No tenderness.   Lymphadenopathy:      Cervical: No cervical adenopathy.     Skin:     General: Skin is warm and dry.      Findings: No lesion or rash.     Neurological:      General: No focal deficit " present.      Mental Status: He is alert and oriented to person, place, and time.      Cranial Nerves: No cranial nerve deficit.      Motor: No weakness.     Psychiatric:         Mood and Affect: Mood normal.         Behavior: Behavior normal.         Thought Content: Thought content normal.         Judgment: Judgment normal.         Assessment/Plan   Problem List Items Addressed This Visit           ICD-10-CM    Arthritis of knee, left M17.12    Benign enlargement of prostate N40.0    Essential hypertension with goal blood pressure less than 130/85 I10    Gastroesophageal reflux disease with hiatal hernia K21.9, K44.9    Hearing loss H91.90    Relevant Orders    Referral to Audiology    Dyslipidemia, goal LDL below 70 E78.5    Overweight with body mass index (BMI) 25.0-29.9 E66.3    Prediabetes R73.03    Relevant Orders    Basic Metabolic Panel    Hemoglobin A1C    Seasonal allergies J30.2    Tubular adenoma of colon D12.6    Annual physical exam - Primary Z00.00    Mild anemia D64.9     Other Visit Diagnoses         Codes      Immunization due     Z23    Relevant Medications    pneumoc 20-humberto conj-dip cr,PF, (Prevnar 20, PF,) 0.5 mL vaccine                Portions of this encounter note have been copied from my previous note dated  2/13/25 , which have been updated where appropriate and all reflect my current medical decision making from today.     Recent labs from 7/7/2025 reviewed  Knee x-rays from 5/14/2025 reviewed, and lumbar x-rays from 1/23/2024 reviewed    Living situation-he has been retired from the [employer].  He lives with his wife who is from [place]. He's in [place] for 2-3 months 4 x yr w/ wife who is [place], and their daughter and her family live there.  Their son lives in [facility]           He remains independent active, each morning doing crossword puzzles and other word games      AHannah Negro w/ RVR April '20 -he was admitted in [place]-converted chemically. Asymptomatic. He saw his  cardiologist, Dr. Mendoza in fall '21. No recent episodes.            Now follows w/ Dr. Perez. Next appt. Mid Jul '25-later today.  No issues with palpitations     hx P. A fib / palpitations - in 2014. Negative Gxt per Dr. Mendoza. No recurrence.              Now follows w/ Dr. Perez. Next appt. Mid Jul '25-later this morning    Elev. chol / /history of borderline elevated blood pressure/history of tobacco use- negative cardiac stress test 8/14 per Dr. Mendoza. Goal LDL <70                Jun '23; LDL - 60  on statin           7/24-LDL 59.            2/25-blood pressure acceptable.  Annual fasting labs ordered         7/25-blood pressure improved on recheck.  He will continue diet and exercise efforts towards weight loss.  LDL 54        mildly dilated  aortic root / ascending arch - noted on 2014 and 2016 echo exams. Reexamined in [facility] in April 2020, and again Sept 2020 - stable at 4.5 cm.    Fall '21 measurements - 4.0 cm. He will continue to follow in cardiology. He'll set up appt. in 15 months w/ Dr. Perez          Echo planned July '23, w/ follow up Nov '23             CT completed 7/1/24; stable from last CT 11/21            He will continue to follow with cardiology        Prediabetes/elevated weight-weight loss discussed. Last time Suggested download a Alonso for his cell phone- Lose It to start to track all calories for the next 3 weeks and then make changes towards a 3-4 pound a month weight loss goal. Weight down a bit this yr. once again encouraged weight loss efforts through the winter              A1c June 2023 improved to 5.5%.BMI at 29. He will continue weight loss efforts.  He remains mindful of caloric intake, limiting carbs and alcohol and maintaining an active exercise routine.               7/24-A1c 5.7%.  BMI 28.96.  Weight is down 7 pounds in 12 months.  He will continue improved diet and limited alcohol.  He will continue lifestyle efforts towards weight loss              2/25-Labs ordered-BMI 30.2 discussed long-term weight loss efforts towards a weight goal closer to 185 or even 180              7/25-BMI 28.5.  A1c 5.9%-stable from where it was 4 years ago.  His weight is actually down 13 pounds from when he turns 68.  We discussed the benefits of long-term weight loss efforts-he will continue to work to optimize his diet towards a goal of weight loss      exercise -   In winter, will do stationary cycle weather permitting - outdoor cycling  4-5 times weekly- every other day or so for an hour.            He reports that he cycles 11 miles in 1 hour when in the [facility] and 15 miles when located in [facility].  Walks 40 min. On the days he doesn't cycle          Winter plan - walking 35 min outdoors daily             7/25-cycling 3-4 times weekly which has helped his knees    Cholecystectomy-5/24 with Dr. Chicas, with a stent placement, which was removed a week later so.    Wuotzmeqwcsyrl-ekwlbubbzorreswzlju-on attributed to Metamucil without much fluid-resolved.  He will follow-up with concerns and continue ample fluid and fiber    Umbilical hernia- s/p surgery 1/10/25 w/ Dr Chicas          Doing well now abut 4 wks post op      Mild anemia-noted on Labs  7/24-will follow up w/ recheck of CBC along with secondary labs in several weeks.  Possibly a postop finding after his abdominal surgery            2/25-Labs ordered              7/25-CBC normal       Right partial knee replacement July 2020 in [facility]-right knee DJD -originally with arthroscopy completed in Dec '18 in [facility],  Now cycling again. walking occas causes knee soreness.               7/25-cycling 3-4 times weekly has helped knees.  Unremarkable knee x-ray 5/25 with Dr. Mckeon    Left knee arthritis-noted on 5/25 knee x-ray per Dr. Mckeon             7/25-improved knee pain with exercising cycling 3 to 4 days weekly.  He will see Dr. Mckeon later this fall in November as scheduled                Right shoulder pain - s/p fall cross country skiing several yrs ago.          2/25 - right shoulder more sore - moderate DJD on xray. Dr. Cheema did an injection, improved. PT to start 2/14/25; encouraged heating pad 20-30 min, then stretch            7/25-recently had a second set of injections with Dr. Bravo.  Generally improved he also has been sleeping differently which helps    Lumbar arthritis/scoliosis/anteriolisthesis-noted on 1/24 lumbar x-rays per Dr. Bravo.           7/25-improved with back exercises.  Encouraged doing the back exercises regularly rather than just when the back is sore, to help with core strength and preventing future back flares down the road.  Exercise and weight loss would also be helpful.         acid reflux -  Hiatal hernia  w/ weight loss - off daily PPI; using OTC nexium prn.  Main triggers include chocolate peppers or  food             7/25-recent flare last week requiring OTC Nexium.  He will continue to optimize diet choices as well as the volume and timing of his evening meal       Non calcified nodule - CT scan revealed nodules less than 6 cm.. Discussed risk assessment as patient is a non smoker - no f/u CT needed based on Fleischner Criteria      Liver nodule - Jan '23 US - right hepatic cyst, and a likely hemangioma     colon polyps / innumerable diverticuli / family hx colon cancer -colonoscopy report filed in Gastro testing section-reviewed with him today. colonoscopy every 3 yrs,              Updated colonoscopy in [facility] - 10/23            next in 3 yrs - Oct '26 by Dr. Zhang, who worked at [facility] in [place]           BPH - improved w/ flomax. he'll see dr. Tena annually         7/25-recent PSA normal.  He just saw Dr. Puckett last week.  Annual follow-up anticipated-next visit 7/26      Remote nephrolithiasis-not problematic since last flare in his 30s.     sleeplessness-he has been on trazodone Presently doing fairly well.      glasses - he  sees dr. momin 1-2 x yr here          Visits UTD, no concerns- just saw him in Jun '25- No cataracts so far      right ear pressure and ringing x 3-4 wks. Discussed starting Allegra 180 for the next 2-3 weeks for improvement. No concerns    Hearing loss-his wife notes this. Will consult audiology for a hearing aid.           Hearing aids on hold for now           7/25 - suggested hearing re-eval, as it's been several yrs     Skin Care concerns - encouraged skin care, sun screen when outdoors, and follow up w/ dermatology w/ any concerning skin changes.             2022; He has a concern on his back. He will set up dermatology consultation           He'll see Dr. Miller late July '24 for a neck spot          7/25 - no active skin issues now     Cyst to left middle digit - Follow up with hand surgeon suggested // declined.     dental care - semiannually-he follows with a dentist in [facility].           - [facility] twice yearly     Healthy aging concerns            7/25-as he goes through this decade, encouraged him to continue to look for ways to maintain his health and stamina.  Specifically focused on the need for ongoing efforts at weight loss in light of his arthritis and prediabetes issues.  Though he eats well, he notes that when they travel it is hard to eat healthy.  Once again encouraged him to focus on more exercise and ways to fewer calories towards a goal of weight loss down the road     Flu shot encouraged each fall-in September ideally     Covid series completed. Suggested booster each fall    Prevnar 20-suggested 7/25     Shingrix series completed.     Follow-up in 6  months sooner with concerns.     Charting was completed using voice recognition technology and may include unintended errors.

## 2025-07-28 NOTE — PROGRESS NOTES
Cardiology Office Note    REASON FOR VISIT: routine follow up visit   PCP (requesting provider): Aleksander Prabhakar MD.    HPI:  Uvaldo Domingo is a 74 y.o. male PMH of Atrial flutter, DLD, Pre-DM, and Ascending aortic aneurysm, who is presenting for a follow up visit.    Last saw Dr. Perez in July 2024, at the time a plan for repeat surveillance CTA at 2-3 year interval was discussed due to stable size of ascending aorta aneurysm at 4.0 cm on 7/1/2024.     Had an episode of flutter into 160s for ~1 hour in May 2025 that resolved with relaxation and breathing exercises- this was brought on by dehydration and intense physical activity.     EKG today revealed sinus arrhythmia, presence of P waves with intermittent changes in R-R intervals.     Cardiac Hx:   - ECHO 07/24: EF 60-65%, aortic root dilation increase from 3.6 to 4.2, mild AI  - CT angiography of chest 07/24: Aneurysmal dilation of the ascending aorta measuring up to 4.0 cm, stable in size  -TTE 7/5/23: EF 60-65%, mid aortic aneurysm 4.2 cm   - TTE 2020 at [facility]: EF 57%, and Aortic root dilation at 4.5 cm  - CT angiography of chest 11/2021: Aneurysmal dilation of the ascending aorta measuring up to 4.0 cm     Labs on 7/7/25:  Hgb 14.4, WBC 4.5, Plt 171k, Na+ 141, BUN 12, Cr 0.88.   Cholesterol 130, TG 76, LDL 54, HBA1c 5.9%    PAST MEDICAL HISTORY  Medical History[1]    PAST SURGICAL HISTORY  Surgical History[2]    FAMILY HISTORY  Family History[3]    SOCIAL HISTORY   reports that he has never smoked. He has never used smokeless tobacco. He reports current alcohol use of about 7.0 standard drinks of alcohol per week. No history on file for drug use.    REVIEW OF SYSTEMS  Review of Systems  A 10+ point review of systems was otherwise negative except as noted and per HPI.    ALLERGIES  Allergies[4]    MEDICATIONS  Current Outpatient Medications   Medication Instructions    apixaban (ELIQUIS) 5 mg, oral, 2 times daily    atenolol (TENORMIN) 25 mg, oral,  Daily    azelastine (Astelin) 137 mcg (0.1 %) nasal spray Administer 2 sprays into each nostril 2 times a day.    cholecalciferol (VITAMIN D3) 50 mcg, oral, Daily    cyanocobalamin (VITAMIN B-12) 1,000 mcg, Daily    diclofenac sodium (Voltaren) 1 % gel Apply 4 gm to affected area 4 times a daily. Do not apply more than 16 gm daily to any one affected joint.    fluticasone (Flonase) 50 mcg/actuation nasal spray 2 sprays, Each Nostril, Daily    simvastatin (ZOCOR) 20 mg, oral, Nightly    tamsulosin (FLOMAX) 0.4 mg, oral, Daily    traZODone (DESYREL) 50 mg, oral, Nightly PRN       VITALS  There were no vitals filed for this visit.   There is no height or weight on file to calculate BMI.    PHYSICAL EXAM  GENERAL: NAD  HEENT: no JVD  CV: regular rate but with intermittent arrhythmia every 3-4 beats, normal S1 and S2  PULM: CTAB  EXT: 1+ LE edema     LABS  WHITE BLOOD CELL COUNT (Thousand/uL)   Date Value   07/07/2025 4.5     HEMOGLOBIN (g/dL)   Date Value   07/07/2025 14.4     PLATELET COUNT (Thousand/uL)   Date Value   07/07/2025 171     SODIUM (mmol/L)   Date Value   07/07/2025 141     POTASSIUM (mmol/L)   Date Value   07/07/2025 4.2     CHLORIDE (mmol/L)   Date Value   07/07/2025 108     CARBON DIOXIDE (mmol/L)   Date Value   07/07/2025 23     UREA NITROGEN (BUN) (mg/dL)   Date Value   07/07/2025 12     CREATININE (mg/dL)   Date Value   07/07/2025 0.88     CALCIUM (mg/dL)   Date Value   07/07/2025 8.8     Total Protein (g/dL)   Date Value   06/24/2024 6.4     Bilirubin, Total (mg/dL)   Date Value   06/24/2024 0.5     Alkaline Phosphatase (U/L)   Date Value   06/24/2024 35     ALT (U/L)   Date Value   07/07/2025 14     AST (U/L)   Date Value   06/24/2024 17     GLUCOSE (mg/dL)   Date Value   07/07/2025 101 (H)     Cholesterol (mg/dL)   Date Value   06/24/2024 136   06/12/2023 142   06/16/2022 129   05/14/2021 121     CHOLESTEROL, TOTAL (mg/dL)   Date Value   07/07/2025 130     LDL-CHOLESTEROL (mg/dL (calc))   Date  Value   07/07/2025 54     LDL Calculated (mg/dL)   Date Value   06/24/2024 59     HDL CHOLESTEROL (mg/dL)   Date Value   07/07/2025 60     HDL-Cholesterol (mg/dL)   Date Value   06/24/2024 57.6     HDL (mg/dL)   Date Value   06/12/2023 66.9   06/16/2022 66.0   05/14/2021 59.0     TRIGLYCERIDES (mg/dL)   Date Value   07/07/2025 76     Triglycerides (mg/dL)   Date Value   06/24/2024 99   06/12/2023 75   06/16/2022 76   05/14/2021 64     HEMOGLOBIN A1c (%)   Date Value   07/07/2025 5.9 (H)     Hemoglobin A1C (%)   Date Value   06/24/2024 5.7 (H)   06/12/2023 5.5   06/16/2022 5.8 (A)   05/14/2021 5.9     Lab Results   Component Value Date    LDLF 60 06/12/2023         ASSESSMENT/PLAN  Uvaldo Domingo is a 74 y.o. male with a past medical history of atrial flutter, DLD, Pre-DM, and Ascending aortic aneurysm presenting for follow up appointment.     #Ascending aortic dilation  -TTE 7/5/23: EF 60-65%, mid aortic aneurysm 4.2 cm   - TTE 2020 at [facility]: EF 57%, and Aortic root dilation at 4.5 cm  - CT angiography of chest 11/2021: Aneurysmal dilation of the ascending aorta measuring up to 4.0 cm   - ECHO 07/24: EF 60-65%, aortic root dilation increase from 3.6 to 4.2, mild AI  - CT angiography of chest 07/24: Aneurysmal dilation of the ascending aorta measuring up to 4.0 cm, stable in size  Plan:  - BP at goal   -Given stable size since 2021 (confirmed again in 2024), repeat imaging next year (June-July 2026     #Atrial fibrillation and flutter   - Atrial fibrillation with palpitations back in 2014. Negative event monitor per Dr. Mendoza. No recurrence.  - Atrial flutter with RVR April 2020 and admitted to the hospital in [facility] and converted chemically  - EKG with NSR today   - CHADS-VASC score: 2 (score >75)  Plan:  -Eliquis 5 mg BID   -Atenolol 25 mg daily      #DLD   - Latest Lipid panel on 7/7: LDL 59, HDL 57, TG 99, total 136  - Highest LDL back in 2015 was 109  Plan:  -Continue Simvastatin 20 mg daily      #Pre-DM  -A1C 5.9 7/7/25     #Modifiable CV Risk Factor Summary  - BP: at goal  - LDL: at goal  - Diabetes: Most recent A1c 5.9 (6/24)  - Obesity: body mass index is 29.75 kg/m².      Follow-up in the office in 1 year with CTA chest w/wo contrast performed before visit.     Assessed and discussed with attending physician Dr. Perez.     Warner Beth MD   PGY-2, Internal Medicine Resident  [facility]          [1]   Past Medical History:  Diagnosis Date    Acute diverticulitis 05/22/2024    Acute sinusitis, unspecified 11/12/2012    Acute sinusitis    Cholelithiasis 06/22/2023    Colon polyp     Derangement of unspecified medial meniscus due to old tear or injury, right knee 11/14/2018    Old tear of medial meniscus of right knee, unspecified tear type    Effusion, left knee 08/03/2018    Effusion of left knee    Encounter for general adult medical examination without abnormal findings 07/08/2015    Encounter for Medicare annual wellness exam    Gall bladder polyp 06/22/2023    Headache, unspecified 11/13/2018    Frontal headache    Iliotibial band syndrome, right leg 10/01/2019    It band syndrome, right    Impaired fasting glucose 05/17/2019    Elevated fasting glucose    Osteoarthritis of knee, unspecified 01/19/2015    Osteoarthritis of knee    Other conditions influencing health status 01/28/2015    Cardiovascular Stress Test    Other specified diseases of liver 06/21/2022    Liver nodule    Pain in right hip 10/04/2017    Right hip pain    Pain in right knee 11/14/2018    Right knee pain    Pain in unspecified knee 04/30/2015    Knee pain    Palpitations 08/13/2014    Palpitations    Personal history of other diseases of the circulatory system 03/05/2018    History of orthostatic hypotension    Personal history of other diseases of the digestive system 06/21/2022    History of cholelithiasis    Personal history of other diseases of the respiratory system 01/29/2019     History of sinusitis    Personal history of urinary calculi 2019    History of renal calculi    Rheumatic tricuspid insufficiency 2014    Tricuspid regurgitation    Strain of muscle, fascia and tendon of right hip, subsequent encounter 10/25/2017    Strain of right hip, subsequent encounter    Thoracic aortic aneurysm, without rupture, unspecified 2017    Thoracic aortic aneurysm without rupture   [2]   Past Surgical History:  Procedure Laterality Date    CHOLECYSTECTOMY N/A      per Dr Chicas    COLONOSCOPY  2012    Complete Colonoscopy   [3]   Family History  Problem Relation Name Age of Onset    Other (cva) Mother Traci Domingo          at 94    Stroke Mother Traci Domingo     Colon cancer Father Uvaldo Domingo          at 83    Other (mitral valve replacement) Father Uvaldo Domingo         MV replac twice    No Known Problems Sister      Other (knee replacemtns) Sister      No Known Problems Daughter      Lumbar disc disease Son          hx remote trauma / back injury   [4]   Allergies  Allergen Reactions    Pollen Extracts Runny nose and Unknown

## 2025-07-31 LAB
ATRIAL RATE: 67 BPM
P AXIS: 37 DEGREES
P OFFSET: 196 MS
P ONSET: 134 MS
PR INTERVAL: 176 MS
Q ONSET: 222 MS
QRS COUNT: 12 BEATS
QRS DURATION: 88 MS
QT INTERVAL: 402 MS
QTC CALCULATION(BAZETT): 424 MS
QTC FREDERICIA: 417 MS
R AXIS: 2 DEGREES
T AXIS: -10 DEGREES
T OFFSET: 423 MS
VENTRICULAR RATE: 67 BPM

## 2025-08-07 ENCOUNTER — TELEPHONE (OUTPATIENT)
Dept: AUDIOLOGY | Facility: HOSPITAL | Age: 75
End: 2025-08-07
Payer: COMMERCIAL

## 2025-10-08 ENCOUNTER — APPOINTMENT (OUTPATIENT)
Dept: ORTHOPEDIC SURGERY | Facility: CLINIC | Age: 75
End: 2025-10-08
Payer: COMMERCIAL

## 2026-02-02 ENCOUNTER — APPOINTMENT (OUTPATIENT)
Dept: PRIMARY CARE | Facility: CLINIC | Age: 76
End: 2026-02-02
Payer: COMMERCIAL

## 2026-07-29 ENCOUNTER — APPOINTMENT (OUTPATIENT)
Dept: PRIMARY CARE | Facility: CLINIC | Age: 76
End: 2026-07-29
Payer: COMMERCIAL